# Patient Record
Sex: MALE | Race: WHITE | NOT HISPANIC OR LATINO | Employment: FULL TIME | URBAN - METROPOLITAN AREA
[De-identification: names, ages, dates, MRNs, and addresses within clinical notes are randomized per-mention and may not be internally consistent; named-entity substitution may affect disease eponyms.]

---

## 2023-10-10 PROBLEM — G47.21 DELAYED SLEEP PHASE SYNDROME: Status: ACTIVE | Noted: 2023-10-10

## 2023-10-10 PROBLEM — G89.29 CHRONIC BACK PAIN: Status: ACTIVE | Noted: 2023-10-10

## 2023-10-10 PROBLEM — J32.9 CHRONIC SINUSITIS: Status: ACTIVE | Noted: 2023-10-10

## 2023-10-10 PROBLEM — G47.19 EXCESSIVE DAYTIME SLEEPINESS: Status: ACTIVE | Noted: 2023-10-10

## 2023-10-10 PROBLEM — R13.14 DYSPHAGIA, PHARYNGOESOPHAGEAL PHASE: Status: ACTIVE | Noted: 2023-10-10

## 2023-10-10 PROBLEM — M54.9 CHRONIC BACK PAIN: Status: ACTIVE | Noted: 2023-10-10

## 2023-10-10 PROBLEM — G47.9 SLEEP DISTURBANCES: Status: ACTIVE | Noted: 2023-10-10

## 2023-10-10 PROBLEM — K20.0 EOSINOPHILIC ESOPHAGITIS: Status: ACTIVE | Noted: 2023-10-10

## 2023-10-10 PROBLEM — F32.A ANXIETY AND DEPRESSION: Status: ACTIVE | Noted: 2023-10-10

## 2023-10-10 PROBLEM — J34.2 DEVIATED SEPTUM: Status: ACTIVE | Noted: 2023-10-10

## 2023-10-10 PROBLEM — F41.9 ANXIETY AND DEPRESSION: Status: ACTIVE | Noted: 2023-10-10

## 2023-10-10 PROBLEM — F51.04 CHRONIC INSOMNIA: Status: ACTIVE | Noted: 2023-10-10

## 2023-10-10 PROBLEM — M51.369 DEGENERATION OF INTERVERTEBRAL DISC OF LUMBAR REGION: Status: ACTIVE | Noted: 2023-10-10

## 2023-10-10 PROBLEM — M51.36 DEGENERATION OF INTERVERTEBRAL DISC OF LUMBAR REGION: Status: ACTIVE | Noted: 2023-10-10

## 2023-10-10 PROBLEM — E04.9 ENLARGED THYROID: Status: ACTIVE | Noted: 2023-10-10

## 2023-10-10 PROBLEM — G47.30 MILD SLEEP APNEA: Status: ACTIVE | Noted: 2023-10-10

## 2023-10-10 PROBLEM — J34.3 HYPERTROPHY OF INFERIOR NASAL TURBINATE: Status: ACTIVE | Noted: 2023-10-10

## 2023-10-10 PROBLEM — M51.36 L4-L5 DISC BULGE: Status: ACTIVE | Noted: 2023-10-10

## 2023-10-10 PROBLEM — J30.2 SEASONAL ALLERGIES: Status: ACTIVE | Noted: 2023-10-10

## 2023-10-10 PROBLEM — L73.9 NASAL FOLLICULITIS: Status: ACTIVE | Noted: 2023-10-10

## 2023-10-10 PROBLEM — L24.9 IRRITANT DERMATITIS: Status: ACTIVE | Noted: 2023-10-10

## 2023-10-10 PROBLEM — E55.9 VITAMIN D DEFICIENCY: Status: ACTIVE | Noted: 2023-10-10

## 2023-10-10 PROBLEM — M51.369 L4-L5 DISC BULGE: Status: ACTIVE | Noted: 2023-10-10

## 2023-10-10 PROBLEM — M41.9 SCOLIOSIS: Status: ACTIVE | Noted: 2023-10-10

## 2023-10-10 PROBLEM — R03.0 ELEVATED BLOOD PRESSURE READING: Status: ACTIVE | Noted: 2023-10-10

## 2023-10-10 PROBLEM — M99.05 SEGMENTAL AND SOMATIC DYSFUNCTION OF PELVIC REGION: Status: ACTIVE | Noted: 2023-10-10

## 2023-10-10 PROBLEM — R06.83 SNORING: Status: ACTIVE | Noted: 2023-10-10

## 2023-10-10 RX ORDER — PSEUDOEPHEDRINE HCL 30 MG
TABLET ORAL
COMMUNITY

## 2023-10-10 RX ORDER — MULTIVITAMIN
TABLET ORAL
COMMUNITY

## 2023-10-10 RX ORDER — OMEPRAZOLE 40 MG/1
1 CAPSULE, DELAYED RELEASE ORAL
COMMUNITY
Start: 2022-05-04

## 2023-10-10 RX ORDER — ACETAMINOPHEN 500 MG
TABLET ORAL
COMMUNITY

## 2023-10-10 RX ORDER — CALCIUM CARBONATE/VITAMIN D3 600 MG-10
TABLET ORAL
COMMUNITY

## 2023-10-11 ENCOUNTER — CLINICAL SUPPORT (OUTPATIENT)
Dept: ALLERGY | Facility: CLINIC | Age: 38
End: 2023-10-11
Payer: COMMERCIAL

## 2023-10-11 DIAGNOSIS — Z76.89 ENCOUNTER FOR ALLERGY INJECTION: ICD-10-CM

## 2023-10-11 PROCEDURE — 95117 IMMUNOTHERAPY INJECTIONS: CPT | Performed by: ALLERGY & IMMUNOLOGY

## 2023-10-11 NOTE — PROGRESS NOTES
Here for routine allergy shots.       Area cleansed with alcohol.  Subcutaneous injection performed in clean fashion. Patient tolerated well without adverse reaction.

## 2023-11-07 PROCEDURE — 95165 ANTIGEN THERAPY SERVICES: CPT | Performed by: ALLERGY & IMMUNOLOGY

## 2023-11-08 ENCOUNTER — CLINICAL SUPPORT (OUTPATIENT)
Dept: ALLERGY | Facility: CLINIC | Age: 38
End: 2023-11-08
Payer: COMMERCIAL

## 2023-11-08 DIAGNOSIS — J30.1 SEASONAL ALLERGIC RHINITIS DUE TO POLLEN: ICD-10-CM

## 2023-11-08 DIAGNOSIS — J30.89 ALLERGIC RHINITIS DUE TO DUST MITE: ICD-10-CM

## 2023-11-08 PROCEDURE — 95165 ANTIGEN THERAPY SERVICES: CPT | Performed by: ALLERGY & IMMUNOLOGY

## 2023-11-08 PROCEDURE — 95117 IMMUNOTHERAPY INJECTIONS: CPT | Performed by: ALLERGY & IMMUNOLOGY

## 2023-11-10 PROBLEM — J30.89 ALLERGIC RHINITIS DUE TO DUST MITE: Status: ACTIVE | Noted: 2023-11-10

## 2023-11-10 PROBLEM — J30.1 SEASONAL ALLERGIC RHINITIS DUE TO POLLEN: Status: ACTIVE | Noted: 2023-11-10

## 2023-11-10 NOTE — PROGRESS NOTES
Here for routine allergy shots.         Area cleansed with alcohol.  Subcutaneous injection performed in clean fashion. Patient tolerated well without adverse reaction.       Remix 2  vials = 12 units

## 2023-12-06 ENCOUNTER — CLINICAL SUPPORT (OUTPATIENT)
Dept: ALLERGY | Facility: CLINIC | Age: 38
End: 2023-12-06
Payer: COMMERCIAL

## 2023-12-06 DIAGNOSIS — J30.89 ALLERGIC RHINITIS DUE TO DUST MITE: ICD-10-CM

## 2023-12-06 DIAGNOSIS — J30.1 SEASONAL ALLERGIC RHINITIS DUE TO POLLEN: ICD-10-CM

## 2023-12-06 PROCEDURE — 95117 IMMUNOTHERAPY INJECTIONS: CPT | Performed by: ALLERGY & IMMUNOLOGY

## 2024-01-03 ENCOUNTER — CLINICAL SUPPORT (OUTPATIENT)
Dept: ALLERGY | Facility: CLINIC | Age: 39
End: 2024-01-03
Payer: COMMERCIAL

## 2024-01-03 DIAGNOSIS — J30.1 SEASONAL ALLERGIC RHINITIS DUE TO POLLEN: ICD-10-CM

## 2024-01-03 DIAGNOSIS — J30.89 ALLERGIC RHINITIS DUE TO DUST MITE: ICD-10-CM

## 2024-01-03 PROCEDURE — 95117 IMMUNOTHERAPY INJECTIONS: CPT | Performed by: ALLERGY & IMMUNOLOGY

## 2024-01-31 ENCOUNTER — CLINICAL SUPPORT (OUTPATIENT)
Dept: ALLERGY | Facility: CLINIC | Age: 39
End: 2024-01-31
Payer: COMMERCIAL

## 2024-01-31 DIAGNOSIS — J30.1 SEASONAL ALLERGIC RHINITIS DUE TO POLLEN: ICD-10-CM

## 2024-01-31 DIAGNOSIS — J30.89 ALLERGIC RHINITIS DUE TO DUST MITE: ICD-10-CM

## 2024-01-31 PROCEDURE — 95117 IMMUNOTHERAPY INJECTIONS: CPT | Performed by: ALLERGY & IMMUNOLOGY

## 2024-02-14 ENCOUNTER — HOSPITAL ENCOUNTER (EMERGENCY)
Facility: HOSPITAL | Age: 39
Discharge: HOME | End: 2024-02-14
Attending: STUDENT IN AN ORGANIZED HEALTH CARE EDUCATION/TRAINING PROGRAM
Payer: COMMERCIAL

## 2024-02-14 VITALS
RESPIRATION RATE: 18 BRPM | HEIGHT: 70 IN | TEMPERATURE: 98.4 F | SYSTOLIC BLOOD PRESSURE: 129 MMHG | HEART RATE: 74 BPM | WEIGHT: 185 LBS | BODY MASS INDEX: 26.48 KG/M2 | DIASTOLIC BLOOD PRESSURE: 66 MMHG | OXYGEN SATURATION: 99 %

## 2024-02-14 DIAGNOSIS — S61.219A FINGER LACERATION, INITIAL ENCOUNTER: Primary | ICD-10-CM

## 2024-02-14 PROCEDURE — 99281 EMR DPT VST MAYX REQ PHY/QHP: CPT | Performed by: STUDENT IN AN ORGANIZED HEALTH CARE EDUCATION/TRAINING PROGRAM

## 2024-02-14 PROCEDURE — 99283 EMERGENCY DEPT VISIT LOW MDM: CPT | Performed by: STUDENT IN AN ORGANIZED HEALTH CARE EDUCATION/TRAINING PROGRAM

## 2024-02-14 ASSESSMENT — LIFESTYLE VARIABLES
HAVE YOU EVER FELT YOU SHOULD CUT DOWN ON YOUR DRINKING: NO
EVER HAD A DRINK FIRST THING IN THE MORNING TO STEADY YOUR NERVES TO GET RID OF A HANGOVER: NO
EVER FELT BAD OR GUILTY ABOUT YOUR DRINKING: NO
HAVE PEOPLE ANNOYED YOU BY CRITICIZING YOUR DRINKING: NO

## 2024-02-14 ASSESSMENT — PAIN - FUNCTIONAL ASSESSMENT: PAIN_FUNCTIONAL_ASSESSMENT: 0-10

## 2024-02-14 ASSESSMENT — COLUMBIA-SUICIDE SEVERITY RATING SCALE - C-SSRS
6. HAVE YOU EVER DONE ANYTHING, STARTED TO DO ANYTHING, OR PREPARED TO DO ANYTHING TO END YOUR LIFE?: NO
2. HAVE YOU ACTUALLY HAD ANY THOUGHTS OF KILLING YOURSELF?: NO
1. IN THE PAST MONTH, HAVE YOU WISHED YOU WERE DEAD OR WISHED YOU COULD GO TO SLEEP AND NOT WAKE UP?: NO

## 2024-02-14 ASSESSMENT — PAIN SCALES - GENERAL: PAINLEVEL_OUTOF10: 0 - NO PAIN

## 2024-02-14 NOTE — DISCHARGE INSTRUCTIONS
If you notice redness streaking up the hand, fevers, inability to flex the finger secondary to swelling or pain then please seek reevaluation.  Keep the Surgicel in place for 24 hours and then remove with moisture.  If you notice signs of infection follow-up here, your primary care doctor or urgent care.

## 2024-02-14 NOTE — ED PROVIDER NOTES
EMERGENCY DEPARTMENT ENCOUNTER      Pt Name: Karthik Plunkett  MRN: [id number]  Birthdate 1985  Date of evaluation: 2/14/2024  Provider: Phi Tse DO    CHIEF COMPLAINT       Chief Complaint   Patient presents with    Laceration         HISTORY OF PRESENT ILLNESS    Mukul is a 38-year-old male presenting emergency department after he cut his finger on a razor that he was using for shaving.  Is a single blade razor that he was opening up and then he sliced the right third digit when he was changing the blade out.  He is kept the site clean with soap and water as well as Neosporin.  He originally presented to an urgent care and they did not examine him and he presented here to the emergency department.  Not had any fevers or chills.  No redness around the site.  No discharge.  This happened yesterday and started bleeding and when he pulled the gauze off.  He does have a tetanus vaccine within the last 7 years      History provided by:  Patient      Nursing Notes were reviewed.    PAST MEDICAL HISTORY     Past Medical History:   Diagnosis Date    Eosinophilic esophagitis 07/20/2022    Eosinophilic esophagitis    Personal history of other diseases of the nervous system and sense organs     History of sleep apnea    Personal history of other diseases of the respiratory system 09/09/2019    History of allergic rhinitis         SURGICAL HISTORY       Past Surgical History:   Procedure Laterality Date    OTHER SURGICAL HISTORY  09/09/2019    Nasal septoplasty         CURRENT MEDICATIONS       Previous Medications    CHOLECALCIFEROL (VITAMIN D3) 50 MCG (2,000 UNIT) CAPSULE    Take by mouth.    FEXOFENADINE HCL (ALLEGRA ORAL)    Take by mouth.    MULTIVITAMIN TABLET    Take by mouth.    OMEGA-3 FATTY ACIDS-FISH OIL (ONE-PER-DAY OMEGA-3) 684-1,200 MG CAPSULE    Take by mouth.    OMEPRAZOLE (PRILOSEC) 40 MG DR CAPSULE    Take 1 capsule (40 mg) by mouth once daily in the morning. Take before meals.  30 mintues before  breakfast.    PSEUDOEPHEDRINE (SUDAFED) 30 MG TABLET    Take by mouth.       ALLERGIES     Patient has no known allergies.    FAMILY HISTORY       Family History   Problem Relation Name Age of Onset    Colonic polyp Father      Other (CVA) Maternal Grandfather            SOCIAL HISTORY       Social History     Socioeconomic History    Marital status: Single     Spouse name: None    Number of children: None    Years of education: None    Highest education level: None   Occupational History    None   Tobacco Use    Smoking status: None    Smokeless tobacco: None   Substance and Sexual Activity    Alcohol use: None    Drug use: None    Sexual activity: None   Other Topics Concern    None   Social History Narrative    None     Social Determinants of Health     Financial Resource Strain: Not on file   Food Insecurity: Not on file   Transportation Needs: Not on file   Physical Activity: Not on file   Stress: Not on file   Social Connections: Not on file   Intimate Partner Violence: Not on file   Housing Stability: Not on file       SCREENINGS                        PHYSICAL EXAM    (up to 7 for level 4, 8 or more for level 5)     ED Triage Vitals [02/14/24 1418]   Temperature Heart Rate Respirations BP   36.9 °C (98.4 °F) 74 18 129/66      Pulse Ox Temp Source Heart Rate Source Patient Position   99 % Temporal Monitor Sitting      BP Location FiO2 (%)     Right arm --       Physical Exam  Constitutional:       General: He is not in acute distress.     Appearance: He is not ill-appearing, toxic-appearing or diaphoretic.   Cardiovascular:      Pulses: Normal pulses.   Pulmonary:      Effort: Pulmonary effort is normal.   Skin:     General: Skin is warm and dry.      Capillary Refill: Capillary refill takes less than 2 seconds.      Findings: Laceration present.      Comments: Laceration to right third digit less than 1 cm in length.  No surrounding erythema.  No active bleeding.  No exudate or discharge.   Neurological:       Mental Status: He is alert.      Comments: Sensation and range of motion intact in hand          DIAGNOSTIC RESULTS     LABS:  Labs Reviewed - No data to display    All other labs were within normal range or not returned as of this dictation.    Imaging  No orders to display        Procedures  Procedures     EMERGENCY DEPARTMENT COURSE/MDM:     ED Course as of 02/14/24 1439 Wed Feb 14, 2024   1435 Patient in no cute distress with 1cm laceration not actively bleeding onSite cleaned, applied surgicel with gauze and tape. Return precautions discussed and patient clear to dischagre [MT]      ED Course User Index  [MT] Phi Tse,          Diagnoses as of 02/14/24 1439   Finger laceration, initial encounter        Medical Decision Making  Karthik zazueta is a 38-year-old male in no acute distress presenting for a right finger laceration.    Laceration happened yesterday with a razor while he was shaving.  He presented to the emergency department after he remove the gauze from yesterday and started bleeding again.  At the sites clean with soap and water as well as Neosporin.  He has a tetanus vaccination within the last 7 years.  He is not on any blood thinners and does not have any other significant medical past history.  No other signs of injury, not in any acute distress, pulses sensation and strength with finger and hand are all intact.    The laceration is very superficial and there is no signs of infection.  No signs of any tendon involvement not actively bleeding. cleaned site with alcohol, applied Surgicel to the site and wrapped in gauze and tape shot.  Gave patient instructions on how to keep the site clean and when to remove the bandage.  Discussed return precautions for signs of infection.  Patient should follow-up with his primary care physician several days if any questions or no signs of improvement        Patient and or family in agreement and understanding of treatment plan.  All questions answered.       I reviewed the case with the attending ED physician. The attending ED physician agrees with the plan. Patient and/or patient´s representative was counseled regarding labs, imaging, likely diagnosis, and plan. All questions were answered.    ED Medications administered this visit:  Medications - No data to display    New Prescriptions from this visit:    New Prescriptions    No medications on file       Follow-up:  Nayan Poon MD  [id number] [id number] [phone]    Call   If symptoms worsen        Final Impression:   1. Finger laceration, initial encounter          (Please note that portions of this note were completed with a voice recognition program.  Efforts were made to edit the dictations but occasionally words are mis-transcribed.)     Phi Tse, DO  Resident  02/14/24 [id number]

## 2024-02-28 ENCOUNTER — CLINICAL SUPPORT (OUTPATIENT)
Dept: ALLERGY | Facility: CLINIC | Age: 39
End: 2024-02-28
Payer: COMMERCIAL

## 2024-02-28 DIAGNOSIS — J30.1 SEASONAL ALLERGIC RHINITIS DUE TO POLLEN: ICD-10-CM

## 2024-02-28 DIAGNOSIS — J30.89 ALLERGIC RHINITIS DUE TO DUST MITE: ICD-10-CM

## 2024-02-28 PROCEDURE — 95117 IMMUNOTHERAPY INJECTIONS: CPT | Performed by: ALLERGY & IMMUNOLOGY

## 2024-03-27 ENCOUNTER — CLINICAL SUPPORT (OUTPATIENT)
Dept: ALLERGY | Facility: CLINIC | Age: 39
End: 2024-03-27
Payer: COMMERCIAL

## 2024-03-27 DIAGNOSIS — J30.1 SEASONAL ALLERGIC RHINITIS DUE TO POLLEN: ICD-10-CM

## 2024-03-27 DIAGNOSIS — J30.89 ALLERGIC RHINITIS DUE TO DUST MITE: ICD-10-CM

## 2024-03-27 PROCEDURE — 95117 IMMUNOTHERAPY INJECTIONS: CPT | Performed by: ALLERGY & IMMUNOLOGY

## 2024-04-24 ENCOUNTER — CLINICAL SUPPORT (OUTPATIENT)
Dept: ALLERGY | Facility: CLINIC | Age: 39
End: 2024-04-24
Payer: COMMERCIAL

## 2024-04-24 DIAGNOSIS — J30.1 SEASONAL ALLERGIC RHINITIS DUE TO POLLEN: ICD-10-CM

## 2024-04-24 DIAGNOSIS — J30.89 ALLERGIC RHINITIS DUE TO DUST MITE: ICD-10-CM

## 2024-04-24 PROCEDURE — 95117 IMMUNOTHERAPY INJECTIONS: CPT | Performed by: ALLERGY & IMMUNOLOGY

## 2024-05-08 ENCOUNTER — OFFICE VISIT (OUTPATIENT)
Dept: ALLERGY | Facility: CLINIC | Age: 39
End: 2024-05-08
Payer: COMMERCIAL

## 2024-05-08 VITALS
RESPIRATION RATE: 17 BRPM | HEIGHT: 70 IN | DIASTOLIC BLOOD PRESSURE: 80 MMHG | WEIGHT: 202 LBS | OXYGEN SATURATION: 97 % | HEART RATE: 81 BPM | SYSTOLIC BLOOD PRESSURE: 120 MMHG | TEMPERATURE: 98.8 F | BODY MASS INDEX: 28.92 KG/M2

## 2024-05-08 DIAGNOSIS — J30.1 ALLERGIC RHINITIS DUE TO POLLEN, UNSPECIFIED SEASONALITY: ICD-10-CM

## 2024-05-08 DIAGNOSIS — R09.81 NASAL CONGESTION: ICD-10-CM

## 2024-05-08 DIAGNOSIS — L23.0 CONTACT DERMATITIS DUE TO METALS, UNSPECIFIED CONTACT DERMATITIS TYPE: ICD-10-CM

## 2024-05-08 DIAGNOSIS — J30.89 SEASONAL ALLERGIC RHINITIS DUE TO OTHER ALLERGIC TRIGGER: Primary | ICD-10-CM

## 2024-05-08 DIAGNOSIS — K20.0 EOSINOPHILIC ESOPHAGITIS: ICD-10-CM

## 2024-05-08 PROCEDURE — 99214 OFFICE O/P EST MOD 30 MIN: CPT | Performed by: ALLERGY & IMMUNOLOGY

## 2024-05-08 RX ORDER — CARBINOXAMINE MALEATE 4 MG/1
4 TABLET ORAL
Qty: 28 TABLET | Refills: 11 | Status: SHIPPED | OUTPATIENT
Start: 2024-05-08 | End: 2025-05-08

## 2024-05-08 RX ORDER — TRIAMCINOLONE ACETONIDE 0.25 MG/G
1 CREAM TOPICAL 2 TIMES DAILY
COMMUNITY
Start: 2023-08-01

## 2024-05-08 RX ORDER — AZELASTINE 1 MG/ML
2 SPRAY, METERED NASAL 2 TIMES DAILY
Qty: 30 ML | Refills: 5 | Status: SHIPPED | OUTPATIENT
Start: 2024-05-08 | End: 2025-05-08

## 2024-05-08 NOTE — PROGRESS NOTES
"Patient ID: Karthik Plunkett \"Marshall" is a 38 y.o. male.     Chief Complaint: Follow up allergic rhinitis  History Of Present Illness  Karthik Plunkett \"Marshall" is a 38 y.o. male with PMx allergic rhinitis, atopic dermatitis, contact dermatitis, eoe presenting for follow up.     Had a contact sensitivity. Established with new primary who referred to derm at [facility]. Had positive patch tests.    Food Allergy  No  EOE hx.  Prilosec BID. Thinking about weaning off Omeprazole. HE is taking Omeprazole once a week. He thinks bread and Peanut butter are triggers.  He does have some dairy in his diet.  We have previously discussed Dupixent and he has also recently discussed with Dr. Barajas.    Eczema/ Atopic Dermatitis  Contact sensitivity testing reviewed.    Asthma  No    Rhinoconjunctivitis  DM, Mold, Tree and Grass mix  Doing well on shots  He has had more congestion just this year. HE has previously not been able to tolerate nasal steroid. He has had septoplasty in the past.  Sometimes uses nasal saline, sometimes helps, sometimes he feels like it might make it worse. He feels like there is something stuck inside his sinus.  Congestion will fluctuate throughout the day.  He feels his smell is less since the septoplasty, but the taste is intact.  He is tolerating the injections without side effects.  On allegra.  Claritin does not work for him  Zyrtec, Xyzal, benadryl and hydroxyzine make him really tired  Nasal steroids cause eye pain.  Sinus saline rinses feel like they get stuck in the sinus.      Drug Allergy   No    Insect Allergy   No    Infections  No history of frequent or recurrent infections      Review of Systems    Pertinent positives and negatives have been assessed in the HPI. All other systems have been reviewed and are negative except as noted in the HPI.    Allergies  Benzalkonium chloride, Carvone, Cocamidopropyl betaine, Dermatitis antigens, Limonene, Linalool, and Oleamidopropyl dimethylamine    Past Medical " "History  He has a past medical history of Eosinophilic esophagitis (07/20/2022), Personal history of other diseases of the nervous system and sense organs, and Personal history of other diseases of the respiratory system (09/09/2019).    Family History  Family History   Problem Relation Name Age of Onset    Colonic polyp Father      Other (CVA) Maternal Grandfather             Surgical History  He has a past surgical history that includes Other surgical history (09/09/2019).    Social/Environmental History  He has no history on file for tobacco use, alcohol use, and drug use.    MEDICATIONS  Current Outpatient Medications on File Prior to Visit   Medication Sig Dispense Refill    cholecalciferol (Vitamin D3) 50 mcg (2,000 unit) capsule Take by mouth.      fexofenadine HCl (ALLEGRA ORAL) Take by mouth.      multivitamin tablet Take by mouth.      omega-3 fatty acids-fish oil (One-Per-Day Omega-3) 684-1,200 mg capsule Take by mouth.      omeprazole (PriLOSEC) 40 mg DR capsule Take 1 capsule (40 mg) by mouth once daily in the morning. Take before meals.  30 mintues before breakfast.      pseudoephedrine (Sudafed) 30 mg tablet Take by mouth.      triamcinolone (Kenalog) 0.025 % cream Apply 1 Application topically 2 times a day.       No current facility-administered medications on file prior to visit.       Physical Exam  Visit Vitals  /80   Pulse 81   Temp 37.1 °C (98.8 °F) (Temporal)   Resp 17   Ht 1.778 m (5' 10\")   Wt 91.6 kg (202 lb)   SpO2 97%   BMI 28.98 kg/m²   BSA 2.13 m²       Wt Readings from Last 1 Encounters:   05/08/24 91.6 kg (202 lb)       Physical Exam    General: Well appearing, no acute distress  Head: Normocephalic, atraumatic, neck supple without lymphadenopathy  Eyes: EOMI, non-injected  Ears: TM's pale with normal landmarks.  Nose: No nasal crease, nares patent, slightly boggy turbinates, minimal discharge  Throat: Normal dentition, no erythema  Heart: Regular rate and rhythm  Lungs: Clear " to auscultation bilaterally, effort normal  Abdomen: Soft, non-tender, normal bowel sounds  Extremities: Moves all extremities symmetrically, no edema  Skin: No rashes/lesions  Psych: normal mood and affect    LAB RESULTS:  CBC:  Recent Labs     08/23/19  1402   WBC 7.8   HGB 16.3   HCT 50.1      MCV 91       CMP:  Recent Labs     09/24/21  1058 08/23/19  1402    139   K 3.7 3.8    104   CO2 24 28   ANIONGAP 13 11   BUN 15 17   CREATININE 1.14 1.09   MG  --  2.10     Recent Labs     08/23/19  1402   ALBUMIN 4.6   ALKPHOS 68   ALT 15   AST 15   BILITOT 1.0       ALLERGY:   Lab Results   Component Value Date    ICIGE 129.0 (H) 09/04/2019    WHITEASH 0.44 (A) 09/04/2019    SILVERBIRCH <0.35 09/04/2019    BOXELDER <0.35 09/04/2019    MOUNTJUNIPER <0.35 09/04/2019    COTTONWOOD <0.35 09/04/2019    ELM <0.35 09/04/2019    MULBERRY <0.35 09/04/2019    PECANHICKORY <0.35 09/04/2019    MAPLESYCAMOR <0.35 09/04/2019    OAK <0.35 09/04/2019    BERMUDAGR 0.55 (A) 09/04/2019    JOHNSONGR <0.35 09/04/2019    BLUEGRASS 3.19 (A) 09/04/2019    TIMOTHYGRASS 1.88 (A) 09/04/2019     Lab Results   Component Value Date    LAMBQUART <0.35 09/04/2019    PIGWEED <0.35 09/04/2019    COMRAGWEED <0.35 09/04/2019    SHEEPSOR <0.35 09/04/2019    PLANTAIN <0.35 09/04/2019    CATEPI <0.35 09/04/2019    DOGEPI <0.35 09/04/2019    ALTERNA 6.39 (A) 09/04/2019    CLADHERB <0.35 09/04/2019    ICA04 <0.35 09/04/2019    DERMFAR <0.35 09/04/2019    DERMPTE <0.35 09/04/2019    COCKR <0.35 09/04/2019     Lab Results   Component Value Date    WALNUT <0.35 09/04/2019     Recent Labs     09/04/19  1200   ICIGE 129.0*     IMMUNO:   Recent Labs     11/03/21  1633   IGG 1,250      IGM 93       HEME/ENDO:  Recent Labs     08/23/19  1402   TSH 1.20         Assessment/Plan   37 yo with a history of allergic rhinitis who is doing well on immunotherapy for dust mite, mold tree and grass.   History of septoplasty. He reports intermittent nasal  congestion. He has not been able to use nasal steroids due to eye side effects (pain) He is willing to try astelin.  He may consider seeing ENT again.  I also recommended a trial of Carbinoxamine since he does not feel like availble OTC antihistamine help.  Contact allergens recently dx at [facility] derm. Skin improved avoiding triggers.  Weaned mostly off PPI and plans follow up with GI doctor.  EOE-controlled at this time      Mercy Stern, DO

## 2024-05-08 NOTE — PATIENT INSTRUCTIONS
To increase the efficacy of your intranasal steroid spray, be sure to look down while using it and point the bottle in the direction of the ear, not the middle part of your nose.  Give a slight sniff in after spraying and then repeat on the other side. If the spray goes to the middle part of the nose (the septum), there are increased risks of side effects such as bleeding from the nose.

## 2024-05-22 ENCOUNTER — CLINICAL SUPPORT (OUTPATIENT)
Dept: ALLERGY | Facility: CLINIC | Age: 39
End: 2024-05-22
Payer: COMMERCIAL

## 2024-05-22 DIAGNOSIS — J30.1 ALLERGIC RHINITIS DUE TO POLLEN, UNSPECIFIED SEASONALITY: ICD-10-CM

## 2024-05-22 DIAGNOSIS — J30.89 ALLERGIC RHINITIS DUE TO DUST MITE: ICD-10-CM

## 2024-05-22 PROCEDURE — 95117 IMMUNOTHERAPY INJECTIONS: CPT | Performed by: ALLERGY & IMMUNOLOGY

## 2024-06-19 ENCOUNTER — APPOINTMENT (OUTPATIENT)
Dept: ALLERGY | Facility: CLINIC | Age: 39
End: 2024-06-19
Payer: COMMERCIAL

## 2024-06-19 DIAGNOSIS — J30.89 ALLERGIC RHINITIS DUE TO DUST MITE: ICD-10-CM

## 2024-06-19 DIAGNOSIS — J30.1 SEASONAL ALLERGIC RHINITIS DUE TO POLLEN: ICD-10-CM

## 2024-06-19 PROCEDURE — 95117 IMMUNOTHERAPY INJECTIONS: CPT | Performed by: ALLERGY & IMMUNOLOGY

## 2024-07-17 ENCOUNTER — OFFICE VISIT (OUTPATIENT)
Dept: ALLERGY | Facility: CLINIC | Age: 39
End: 2024-07-17
Payer: COMMERCIAL

## 2024-07-17 ENCOUNTER — APPOINTMENT (OUTPATIENT)
Dept: ALLERGY | Facility: CLINIC | Age: 39
End: 2024-07-17
Payer: COMMERCIAL

## 2024-07-17 VITALS
WEIGHT: 202 LBS | RESPIRATION RATE: 17 BRPM | DIASTOLIC BLOOD PRESSURE: 78 MMHG | TEMPERATURE: 98.7 F | OXYGEN SATURATION: 95 % | BODY MASS INDEX: 28.92 KG/M2 | HEIGHT: 70 IN | HEART RATE: 73 BPM | SYSTOLIC BLOOD PRESSURE: 122 MMHG

## 2024-07-17 DIAGNOSIS — L20.9 ATOPIC DERMATITIS, UNSPECIFIED TYPE: ICD-10-CM

## 2024-07-17 DIAGNOSIS — L20.89 OTHER ATOPIC DERMATITIS: Primary | ICD-10-CM

## 2024-07-17 DIAGNOSIS — H01.139 ECZEMA OF EYELID, UNSPECIFIED LATERALITY: ICD-10-CM

## 2024-07-17 PROCEDURE — 99214 OFFICE O/P EST MOD 30 MIN: CPT | Performed by: ALLERGY & IMMUNOLOGY

## 2024-07-17 PROCEDURE — 3008F BODY MASS INDEX DOCD: CPT | Performed by: ALLERGY & IMMUNOLOGY

## 2024-07-17 PROCEDURE — 95117 IMMUNOTHERAPY INJECTIONS: CPT | Performed by: ALLERGY & IMMUNOLOGY

## 2024-07-17 RX ORDER — TACROLIMUS 1 MG/G
OINTMENT TOPICAL 2 TIMES DAILY
Qty: 100 G | Refills: 1 | Status: SHIPPED | OUTPATIENT
Start: 2024-07-17 | End: 2024-08-16

## 2024-07-17 RX ORDER — PIMECROLIMUS 10 MG/G
CREAM TOPICAL 2 TIMES DAILY
Qty: 30 G | Refills: 1 | Status: SHIPPED | OUTPATIENT
Start: 2024-07-17 | End: 2024-08-16

## 2024-07-17 RX ORDER — TRIAMCINOLONE ACETONIDE 0.25 MG/G
OINTMENT TOPICAL 2 TIMES DAILY
Qty: 80 G | Refills: 2 | Status: SHIPPED | OUTPATIENT
Start: 2024-07-17

## 2024-07-17 NOTE — PROGRESS NOTES
"Patient ID: Karthik Plunkett \"Marshall" is a 39 y.o. male.     History Of Present Illness  Karthik Plunkett \"Marshall" is a 39 y.o. male with PMx Atopic Dermatitis/Contact Dermatitis, EoE, Allergic Rhinitis/Allergic Conjunctivitis presenting for rash.      Atopic Dermatitis/Contact Dermatitis:  -Patient had patch testing performed by Dermatology ([facility]) with positive patch results  -Patient has been following his safe list; states there were no shampoos listed on his safe list so he has been trying to find bland shampoos to use (has used Vanicream shampoo in the past)  -He has noticed some improvement in rash, but he breaks out in a rash every 1-2 weeks  -Rash develops above eyebrow and eyelid and cheeks; rash is not present today  -Initially when rash started months ago he had associated erythema, but now it appears that he only has dryness and skin flaking occurring  -States he will apply steroid cream for a day a two and then stops, and then rash occurs a week or two later  -Moisturizes intermittently with Vanicream  -Current regimen: triamcinolone cream     EoE:  -Well-controlled  -Denies dysphagia; occasionally has heart burn  -weaned off omeprazole since LOV and doing well  -Continues to avoid bread and peanuts  -Loosely avoids dairy  -Discussed starting Dupixent in the past  -Currently not on any medications     Allergic Rhinitis/Allergic Conjunctivitis:  -ST+ DM, Mold, Tree and Grass mix  -Has noticed some improvement on SCIT, but feels he has plateaued  -Tolerating SCIT well  -Current regimen: SCIT [id number], azelastine 2 spray/nostril BID as needed, Allegra 180 mg daily as needed      Review of Systems    Pertinent positives and negatives have been assessed in the HPI. All other systems have been reviewed and are negative except as noted in the HPI.    Allergies  Benzalkonium chloride, Carvone, Cocamidopropyl betaine, Dermatitis antigens, Limonene, Linalool, and Oleamidopropyl dimethylamine    Past Medical History  He has " a past medical history of Eosinophilic esophagitis (07/20/2022), Personal history of other diseases of the nervous system and sense organs, and Personal history of other diseases of the respiratory system (09/09/2019).    Family History  Family History   Problem Relation Name Age of Onset    Colonic polyp Father      Other (CVA) Maternal Grandfather       Surgical History  He has a past surgical history that includes Other surgical history (09/09/2019).    Social/Environmental History  He has no history on file for tobacco use, alcohol use, and drug use.      MEDICATIONS  Current Outpatient Medications on File Prior to Visit   Medication Sig Dispense Refill    azelastine (Astelin) 137 mcg (0.1 %) nasal spray Administer 2 sprays into each nostril 2 times a day. Use in each nostril as directed 30 mL 5    carbinoxamine maleate 4 mg tablet Take 4 mg by mouth 2 times a day. 28 tablet 11    cholecalciferol (Vitamin D3) 50 mcg (2,000 unit) capsule Take by mouth.      fexofenadine HCl (ALLEGRA ORAL) Take by mouth.      multivitamin tablet Take by mouth.      omega-3 fatty acids-fish oil (One-Per-Day Omega-3) 684-1,200 mg capsule Take by mouth.      omeprazole (PriLOSEC) 40 mg DR capsule Take 1 capsule (40 mg) by mouth once daily in the morning. Take before meals.  30 mintues before breakfast.      pseudoephedrine (Sudafed) 30 mg tablet Take by mouth.      triamcinolone (Kenalog) 0.025 % cream Apply 1 Application topically 2 times a day.       No current facility-administered medications on file prior to visit.         Physical Exam  There were no vitals taken for this visit.    Wt Readings from Last 1 Encounters:   05/08/24 91.6 kg (202 lb)       Physical Exam    General: Well appearing, no acute distress  Head: Normocephalic, atraumatic, neck supple without lymphadenopathy  Eyes: EOMI, non-injected  Nose: No nasal crease, nares patent, normal turbinates, no discharge  Throat: Normal dentition, no erythema  Heart: Regular  rate and rhythm  Lungs: Clear to auscultation bilaterally, effort normal  Abdomen: Soft, non-tender, normal bowel sounds  Extremities: Moves all extremities symmetrically, no edema  Skin: No rashes/lesions  Psych: normal mood and affect    LAB RESULTS:  CBC:  Recent Labs     08/23/19  1402   WBC 7.8   HGB 16.3   HCT 50.1      MCV 91       CMP:  Recent Labs     09/24/21  1058 08/23/19  1402    139   K 3.7 3.8    104   CO2 24 28   ANIONGAP 13 11   BUN 15 17   CREATININE 1.14 1.09   MG  --  2.10     Recent Labs     08/23/19  1402   ALBUMIN 4.6   ALKPHOS 68   ALT 15   AST 15   BILITOT 1.0       ALLERGY:   Lab Results   Component Value Date    ICIGE 129.0 (H) 09/04/2019    WHITEASH 0.44 (A) 09/04/2019    SILVERBIRCH <0.35 09/04/2019    BOXELDER <0.35 09/04/2019    MOUNTJUNIPER <0.35 09/04/2019    COTTONWOOD <0.35 09/04/2019    ELM <0.35 09/04/2019    MULBERRY <0.35 09/04/2019    PECANHICKORY <0.35 09/04/2019    MAPLESYCAMOR <0.35 09/04/2019    OAK <0.35 09/04/2019    BERMUDAGR 0.55 (A) 09/04/2019    JOHNSONGR <0.35 09/04/2019    BLUEGRASS 3.19 (A) 09/04/2019    TIMOTHYGRASS 1.88 (A) 09/04/2019     Lab Results   Component Value Date    LAMBQUART <0.35 09/04/2019    PIGWEED <0.35 09/04/2019    COMRAGWEED <0.35 09/04/2019    SHEEPSOR <0.35 09/04/2019    PLANTAIN <0.35 09/04/2019    CATEPI <0.35 09/04/2019    DOGEPI <0.35 09/04/2019    ALTERNA 6.39 (A) 09/04/2019    CLADHERB <0.35 09/04/2019    ICA04 <0.35 09/04/2019    DERMFAR <0.35 09/04/2019    DERMPTE <0.35 09/04/2019    COCKR <0.35 09/04/2019     Lab Results   Component Value Date    WALNUT <0.35 09/04/2019       Recent Labs     09/04/19  1200   ICIGE 129.0*       IMMUNO:   Recent Labs     11/03/21  1633   IGG 1,250      IGM 93       HEME/ENDO:  Recent Labs     08/23/19  1402   TSH 1.20     Procedure: Pt here for routine subcutaneous allergy injections. Pt received 0.5mL in L and R arms today. Pt tolerated procedure well without any adverse  "reactions. Sara Bobby LPN    Assessment/Plan   Assessment:  Karthik Plunkett \"Marshall" is a 39 y.o. male with PMx Atopic Dermatitis/Contact Dermatitis, EoE, Allergic Rhinitis/Allergic Conjunctivitis presenting for rash. Advised patient to use full 14-day course of topical steroid when rash does occur. Will also provided topical tacrolimus to apply 2x a week to affected areas regardless if rash is present.    Plan:  Atopic Dermatitis/Contact Dermatitis:  -Start topical tacrolimus 2x per week to common areas affected  -Continue with triamcinolone BID x2 weeks when rash does occur  -Recommend moisturizing daily  -Continue to follow safe list     EoE:  -Stable  -Continue to avoid wheat, peanut, dairy  -Consider starting Dupixent if symptoms recur     Allergic Rhinitis/Allergic Conjunctivitis:  -Stable  -Continue: SCIT [id number], azelastine 2 spray/nostril BID as needed, Allegra 180 mg daily as needed        Discussed with attending physician,    Antoine Sampson DO PGY-5    I saw and evaluated the patient. I personally obtained the key and critical portions of the history and physical exam or was physically present for key and critical portions performed by the resident/fellow. I reviewed the resident/fellow's documentation and discussed the patient with the resident/fellow. I agree with the resident/fellow's medical decision making as documented in the note.    Mercy Stern DO   "

## 2024-08-13 ENCOUNTER — APPOINTMENT (OUTPATIENT)
Dept: ALLERGY | Facility: CLINIC | Age: 39
End: 2024-08-13
Payer: COMMERCIAL

## 2024-08-21 ENCOUNTER — CLINICAL SUPPORT (OUTPATIENT)
Dept: ALLERGY | Facility: CLINIC | Age: 39
End: 2024-08-21
Payer: COMMERCIAL

## 2024-08-21 DIAGNOSIS — J30.1 SEASONAL ALLERGIC RHINITIS DUE TO POLLEN: ICD-10-CM

## 2024-08-21 DIAGNOSIS — J30.89 ALLERGIC RHINITIS DUE TO DUST MITE: ICD-10-CM

## 2024-08-21 PROCEDURE — 95165 ANTIGEN THERAPY SERVICES: CPT | Performed by: ALLERGY & IMMUNOLOGY

## 2024-08-21 PROCEDURE — 95117 IMMUNOTHERAPY INJECTIONS: CPT | Performed by: ALLERGY & IMMUNOLOGY

## 2024-08-23 NOTE — PROGRESS NOTES
Here for routine allergy shots.         Area cleansed with alcohol.  Subcutaneous injection performed in clean fashion. Patient tolerated well without adverse reaction.       REMIXED 2  VIALS = 12 UNITS

## 2024-09-11 ENCOUNTER — APPOINTMENT (OUTPATIENT)
Dept: ALLERGY | Facility: CLINIC | Age: 39
End: 2024-09-11
Payer: COMMERCIAL

## 2024-09-18 ENCOUNTER — APPOINTMENT (OUTPATIENT)
Dept: ALLERGY | Facility: CLINIC | Age: 39
End: 2024-09-18
Payer: COMMERCIAL

## 2024-09-18 DIAGNOSIS — J30.89 ALLERGIC RHINITIS DUE TO DUST MITE: ICD-10-CM

## 2024-09-18 DIAGNOSIS — J30.1 SEASONAL ALLERGIC RHINITIS DUE TO POLLEN: ICD-10-CM

## 2024-09-18 PROCEDURE — 95117 IMMUNOTHERAPY INJECTIONS: CPT | Performed by: ALLERGY & IMMUNOLOGY

## 2024-09-19 ENCOUNTER — OFFICE VISIT (OUTPATIENT)
Dept: PRIMARY CARE | Facility: CLINIC | Age: 39
End: 2024-09-19
Payer: COMMERCIAL

## 2024-09-19 VITALS
DIASTOLIC BLOOD PRESSURE: 80 MMHG | BODY MASS INDEX: 28.41 KG/M2 | WEIGHT: 198 LBS | SYSTOLIC BLOOD PRESSURE: 128 MMHG | OXYGEN SATURATION: 98 % | TEMPERATURE: 98 F | HEART RATE: 78 BPM | RESPIRATION RATE: 16 BRPM

## 2024-09-19 DIAGNOSIS — H60.332 ACUTE SWIMMER'S EAR OF LEFT SIDE: ICD-10-CM

## 2024-09-19 DIAGNOSIS — H66.92 ACUTE OTITIS MEDIA, LEFT: Primary | ICD-10-CM

## 2024-09-19 PROCEDURE — 99214 OFFICE O/P EST MOD 30 MIN: CPT | Performed by: FAMILY MEDICINE

## 2024-09-19 PROCEDURE — 1036F TOBACCO NON-USER: CPT | Performed by: FAMILY MEDICINE

## 2024-09-19 RX ORDER — AMOXICILLIN AND CLAVULANATE POTASSIUM 875; 125 MG/1; MG/1
875 TABLET, FILM COATED ORAL 2 TIMES DAILY
Qty: 20 TABLET | Refills: 0 | Status: SHIPPED | OUTPATIENT
Start: 2024-09-19 | End: 2024-09-29

## 2024-09-19 RX ORDER — OFLOXACIN 3 MG/ML
10 SOLUTION AURICULAR (OTIC) DAILY
Qty: 0.35 ML | Refills: 0 | Status: SHIPPED | OUTPATIENT
Start: 2024-09-19 | End: 2024-09-26

## 2024-09-19 RX ORDER — TACROLIMUS 0.3 MG/G
OINTMENT TOPICAL 2 TIMES DAILY
COMMUNITY

## 2024-09-19 ASSESSMENT — ENCOUNTER SYMPTOMS
VOMITING: 0
DIFFICULTY URINATING: 0
FEVER: 0
RHINORRHEA: 1
WHEEZING: 0
SHORTNESS OF BREATH: 0
SORE THROAT: 0
DIARRHEA: 0
NAUSEA: 0

## 2024-09-19 NOTE — PROGRESS NOTES
Subjective   Patient ID: Mukul Plunkett is a 39 y.o. male who presents for Ear Fullness.    Ear Fullness   There is pain in the left ear. Episode onset: 1 week. There has been no fever. Associated symptoms include rhinorrhea. Pertinent negatives include no diarrhea, ear discharge, sore throat or vomiting. Associated symptoms comments: Foreign body sensation.        Review of Systems   Constitutional:  Negative for fever.   HENT:  Positive for rhinorrhea. Negative for congestion, ear discharge, ear pain and sore throat.         Muffled hearing left ear  Feels like something is in left ear  Has hx allergies   Respiratory:  Negative for shortness of breath and wheezing.    Cardiovascular:  Negative for chest pain.   Gastrointestinal:  Negative for diarrhea, nausea and vomiting.   Genitourinary:  Negative for difficulty urinating.       Objective   /80   Pulse 78   Temp 36.7 °C (98 °F)   Resp 16   Wt 89.8 kg (198 lb)   SpO2 98%   BMI 28.41 kg/m²     Physical Exam  Vitals and nursing note reviewed.   Constitutional:       General: He is not in acute distress.     Appearance: Normal appearance.   HENT:      Head: Normocephalic and atraumatic.      Right Ear: Tympanic membrane, ear canal and external ear normal.      Left Ear: External ear normal.      Ears:      Comments: Left canal with edema, left tm is dull and thick     Nose: Nose normal.      Mouth/Throat:      Mouth: Mucous membranes are moist.      Pharynx: Oropharynx is clear.   Cardiovascular:      Rate and Rhythm: Normal rate and regular rhythm.      Heart sounds: Normal heart sounds.   Pulmonary:      Effort: Pulmonary effort is normal.      Breath sounds: Normal breath sounds.   Musculoskeletal:      Cervical back: Neck supple.   Lymphadenopathy:      Cervical: No cervical adenopathy.   Skin:     General: Skin is warm and dry.   Neurological:      Mental Status: He is alert.         Assessment/Plan   Problem List Items Addressed This Visit              ICD-10-CM    Acute otitis media, left - Primary H66.92     Advise pt to take med as directed  F/up if no improvement         Relevant Medications    amoxicillin-pot clavulanate (Augmentin) 875-125 mg tablet    Acute swimmer's ear of left side H60.332     Advise pt to use ear gtts as directed  Avoid water in ear  Do not use q tips in ear  F/up if no improvement         Relevant Medications    ofloxacin (Floxin) 0.3 % otic solution

## 2024-09-19 NOTE — ASSESSMENT & PLAN NOTE
Advise pt to use ear gtts as directed  Avoid water in ear  Do not use q tips in ear  F/up if no improvement

## 2024-10-01 ENCOUNTER — OFFICE VISIT (OUTPATIENT)
Dept: PRIMARY CARE | Facility: CLINIC | Age: 39
End: 2024-10-01
Payer: COMMERCIAL

## 2024-10-01 VITALS
OXYGEN SATURATION: 98 % | TEMPERATURE: 99.5 F | BODY MASS INDEX: 28.93 KG/M2 | RESPIRATION RATE: 20 BRPM | WEIGHT: 201.6 LBS | SYSTOLIC BLOOD PRESSURE: 120 MMHG | HEART RATE: 76 BPM | DIASTOLIC BLOOD PRESSURE: 80 MMHG

## 2024-10-01 DIAGNOSIS — H66.001 NON-RECURRENT ACUTE SUPPURATIVE OTITIS MEDIA OF RIGHT EAR WITHOUT SPONTANEOUS RUPTURE OF TYMPANIC MEMBRANE: Primary | ICD-10-CM

## 2024-10-01 DIAGNOSIS — H60.503 ACUTE OTITIS EXTERNA OF BOTH EARS, UNSPECIFIED TYPE: ICD-10-CM

## 2024-10-01 PROCEDURE — 99213 OFFICE O/P EST LOW 20 MIN: CPT | Performed by: NURSE PRACTITIONER

## 2024-10-01 PROCEDURE — 1036F TOBACCO NON-USER: CPT | Performed by: NURSE PRACTITIONER

## 2024-10-01 RX ORDER — OFLOXACIN 3 MG/ML
5 SOLUTION AURICULAR (OTIC) 2 TIMES DAILY
Qty: 0.35 ML | Refills: 0 | Status: SHIPPED | OUTPATIENT
Start: 2024-10-01 | End: 2024-10-08

## 2024-10-01 ASSESSMENT — ENCOUNTER SYMPTOMS
CONSTIPATION: 0
NAUSEA: 0
SORE THROAT: 0
ACTIVITY CHANGE: 0
VOMITING: 0
CHILLS: 0
FATIGUE: 0
DIARRHEA: 0
APPETITE CHANGE: 0
FEVER: 0
COUGH: 0

## 2024-10-01 NOTE — PROGRESS NOTES
Subjective   Patient ID: Mukul Plunkett is a 39 y.o. male who presents for Cerumen Impaction (Left ear/).    L ear fullness  Feels like something is stuck    Last week dx w/inner and outer ear infection  Took Augmentin/ofloxacin  Improved, but thinks it is coming back    Uses ear buds at work  Has been using ear plugs while in the shower  Also, using debrox         Review of Systems   Constitutional:  Negative for activity change, appetite change, chills, fatigue and fever.   HENT:  Positive for ear pain. Negative for congestion, ear discharge and sore throat.    Respiratory:  Negative for cough.    Gastrointestinal:  Negative for constipation, diarrhea, nausea and vomiting.       Objective   /80   Pulse 76   Temp 37.5 °C (99.5 °F)   Resp 20   Wt 91.4 kg (201 lb 9.6 oz)   SpO2 98%   BMI 28.93 kg/m²     Physical Exam  Vitals reviewed.   Constitutional:       General: He is not in acute distress.     Appearance: Normal appearance. He is not ill-appearing or toxic-appearing.   HENT:      Head: Normocephalic.      Right Ear: Laceration and tenderness present.      Left Ear: Tenderness present.      Ears:      Comments: R ear canal with tenderness and dried blood noted in canal  L ear with mild tenderness     Nose: Mucosal edema, congestion and rhinorrhea present. Rhinorrhea is clear.      Right Turbinates: Swollen.      Left Turbinates: Swollen.      Mouth/Throat:      Lips: Pink.      Mouth: Mucous membranes are moist.      Pharynx: Posterior oropharyngeal erythema present.   Eyes:      Extraocular Movements: Extraocular movements intact.      Conjunctiva/sclera: Conjunctivae normal.      Pupils: Pupils are equal, round, and reactive to light.   Cardiovascular:      Rate and Rhythm: Normal rate and regular rhythm.      Pulses: Normal pulses.      Heart sounds: Normal heart sounds.   Pulmonary:      Effort: Pulmonary effort is normal.      Breath sounds: Normal breath sounds.   Skin:     General: Skin is warm.       Capillary Refill: Capillary refill takes less than 2 seconds.   Neurological:      General: No focal deficit present.      Mental Status: He is alert and oriented to person, place, and time.   Psychiatric:         Mood and Affect: Mood normal.         Behavior: Behavior normal.         Assessment/Plan   Diagnoses and all orders for this visit:  Non-recurrent acute suppurative otitis media of right ear without spontaneous rupture of tympanic membrane  -     Referral to ENT; Future  Acute otitis externa of both ears, unspecified type  -     ofloxacin (Floxin) 0.3 % otic solution; Administer 5 drops into each ear 2 times a day for 7 days.    Referral made to ENT for follow up  Use ears drops x7 days  Continue with allergy med and preferred nasal spray  Encouraged alternate ear device such as headphones instead of ear buds  Follow up with PCP as needed  ER for any loss of hearing or worsening of symptoms

## 2024-10-07 ENCOUNTER — APPOINTMENT (OUTPATIENT)
Dept: OTOLARYNGOLOGY | Facility: CLINIC | Age: 39
End: 2024-10-07
Payer: COMMERCIAL

## 2024-10-07 DIAGNOSIS — H66.92 ACUTE OTITIS MEDIA, LEFT: Primary | ICD-10-CM

## 2024-10-07 DIAGNOSIS — J30.89 ALLERGIC RHINITIS DUE TO DUST MITE: ICD-10-CM

## 2024-10-07 DIAGNOSIS — J30.1 SEASONAL ALLERGIC RHINITIS DUE TO POLLEN: ICD-10-CM

## 2024-10-07 PROCEDURE — 99203 OFFICE O/P NEW LOW 30 MIN: CPT | Performed by: OTOLARYNGOLOGY

## 2024-10-07 ASSESSMENT — ENCOUNTER SYMPTOMS
CONSTITUTIONAL NEGATIVE: 1
RESPIRATORY NEGATIVE: 1
CARDIOVASCULAR NEGATIVE: 1
NEUROLOGICAL NEGATIVE: 1

## 2024-10-07 NOTE — PROGRESS NOTES
"Subjective   Patient ID: Karthik Plunkett \"Marshall" is a 39 y.o. male who presents for EAR INFECTION.  He is seen at the kind request of Dr. Poon.     HPI  This patient has been noted to have a history of previous septoplasty and turbinate surgery with me along with a history of severe  allergies on immunotherapy shots now for about 4 to 5 years at a minimum.  Recently had evidence of infection with fluid.  He is now here for assessment of same.  He stipulates his breathing is okay but is wanting to find out if we can do any ostomy can even further optimized.  All remaining ENT inquiry is otherwise clear.      Review of Systems   Constitutional: Negative.    HENT: Negative.     Respiratory: Negative.     Cardiovascular: Negative.    Neurological: Negative.        Physical Exam    General appearance: No acute distress. Normal facies. Symmetric facial movement. No gross lesions of the face are noted.  The external ear structures appear normal. The ear canals patent and the tympanic membranes are intact without evidence of air-fluid levels, retraction, or congenital defects.  Anterior rhinoscopy notes essentially a midline nasal septum. Examination is noted for normal healthy mucosal membranes without any evidence of lesions, polyps, or exudate. The tongue is normally mobile. There are no lesions on the gingiva, buccal, or oral mucosa. There are no oral cavity masses.  The neck is negative for mass lymphadenopathy. The trachea and parotid are clear. The thyroid bed is grossly unremarkable. The salivary gland structures are grossly unremarkable.    Assessment/Plan     1.  History of recent ear infection now with complete resolution of same.  Patient reassured.  We see nothing worrisome.  I do suspect the very high pollen counts we have had throughout this spring and summer into the fall have led to his symptomatology but he looks great.  At this juncture I do not appreciate anything worrisome so we will favor " observation.  2.  History of previous septoplasty and turbinate surgery.  Overall his nose still looks fairly good.  I do not appreciate any obvious obstruction.  He certainly would like to breathe a little bit better but he did not tolerate nasal steroids in the past and I have only recommend utilization of saline sprays to help keep the pollen counts down.  He has already tried Zyrtec which makes him very tired and so he is utilizing Allegra.  Unfortunately he does not have many other options in this regard and further surgery is certainly not warranted based on his exam.  All questions were answered in this regard accordingly.

## 2024-10-16 ENCOUNTER — APPOINTMENT (OUTPATIENT)
Dept: ALLERGY | Facility: CLINIC | Age: 39
End: 2024-10-16
Payer: COMMERCIAL

## 2024-10-16 DIAGNOSIS — J30.89 ALLERGIC RHINITIS DUE TO DUST MITE: ICD-10-CM

## 2024-10-16 DIAGNOSIS — J30.1 SEASONAL ALLERGIC RHINITIS DUE TO POLLEN: ICD-10-CM

## 2024-10-16 PROCEDURE — 95117 IMMUNOTHERAPY INJECTIONS: CPT | Performed by: ALLERGY & IMMUNOLOGY

## 2024-11-13 ENCOUNTER — APPOINTMENT (OUTPATIENT)
Dept: ALLERGY | Facility: CLINIC | Age: 39
End: 2024-11-13
Payer: COMMERCIAL

## 2024-11-13 VITALS
HEIGHT: 70 IN | RESPIRATION RATE: 17 BRPM | WEIGHT: 201 LBS | OXYGEN SATURATION: 98 % | BODY MASS INDEX: 28.77 KG/M2 | DIASTOLIC BLOOD PRESSURE: 70 MMHG | SYSTOLIC BLOOD PRESSURE: 110 MMHG | TEMPERATURE: 98.1 F | HEART RATE: 74 BPM

## 2024-11-13 DIAGNOSIS — L20.89 OTHER ATOPIC DERMATITIS: ICD-10-CM

## 2024-11-13 DIAGNOSIS — J30.89 SEASONAL ALLERGIC RHINITIS DUE TO OTHER ALLERGIC TRIGGER: Primary | ICD-10-CM

## 2024-11-13 DIAGNOSIS — K20.0 EOSINOPHILIC ESOPHAGITIS: ICD-10-CM

## 2024-11-13 DIAGNOSIS — L23.5 ALLERGIC DERMATITIS DUE TO OTHER CHEMICAL PRODUCT: ICD-10-CM

## 2024-11-13 PROCEDURE — 95117 IMMUNOTHERAPY INJECTIONS: CPT | Performed by: ALLERGY & IMMUNOLOGY

## 2024-11-13 PROCEDURE — 99214 OFFICE O/P EST MOD 30 MIN: CPT | Performed by: ALLERGY & IMMUNOLOGY

## 2024-11-13 PROCEDURE — 3008F BODY MASS INDEX DOCD: CPT | Performed by: ALLERGY & IMMUNOLOGY

## 2024-11-13 NOTE — PROGRESS NOTES
"Patient ID: Karthik Plunkett \"Marshall" is a 39 y.o. male.     Chief Complaint: Follow up  History Of Present Illness  Karthik Plunkett \"Marshall" is a 39 y.o. male with PMx allergic rhinitis to dust mite, mold, tree, grass mix, chronic sinus complaints presenting for follow up.     Food Allergy  EOE-controlled on PPI bid.  Avoids peanut and bread.    Eczema/ Atopic Dermatitis  Facial rash-pro topic and topical steroid intermittently helpful. Alternates for short periods.    Asthma  No    Rhinoconjunctivitis  Saline spray more frequently is helpful.  Maintenance immunotherapy for perennial and pollen.    Drug Allergy   Multiple contact allergens-sees dermatology as well    Insect Allergy   No    Infections  No history of frequent or recurrent infections      Review of Systems    Pertinent positives and negatives have been assessed in the HPI. All other systems have been reviewed and are negative except as noted in the HPI.    Allergies  Benzalkonium chloride, Carvone, Cocamidopropyl betaine, Dermatitis antigens, Limonene, Linalool, and Oleamidopropyl dimethylamine    Past Medical History  He has a past medical history of Eosinophilic esophagitis (07/20/2022), Personal history of other diseases of the nervous system and sense organs, and Personal history of other diseases of the respiratory system (09/09/2019).    Family History  Family History   Problem Relation Name Age of Onset    Colonic polyp Father      Other (CVA) Maternal Grandfather       Surgical History  He has a past surgical history that includes Other surgical history (09/09/2019).    Social/Environmental History  He reports that he has never smoked. He has never been exposed to tobacco smoke. He has never used smokeless tobacco. No history on file for alcohol use and drug use.    MEDICATIONS  Current Outpatient Medications on File Prior to Visit   Medication Sig Dispense Refill    cholecalciferol (Vitamin D3) 50 mcg (2,000 unit) capsule Take by mouth.      " "fexofenadine HCl (ALLEGRA ORAL) Take by mouth.      multivitamin tablet Take by mouth.      omega-3 fatty acids-fish oil (One-Per-Day Omega-3) 684-1,200 mg capsule Take by mouth.      omeprazole (PriLOSEC) 40 mg DR capsule Take 1 capsule (40 mg) by mouth once daily in the morning. Take before meals.  30 mintues before breakfast.      tacrolimus (Protopic) 0.03 % ointment Apply topically 2 times a day.      triamcinolone (Kenalog) 0.025 % ointment Apply topically 2 times a day. Apply twice a day until the lesions are flat and smooth. Do not use longer than 14 days at a time - if not resolving the lesions after 14 days, please let us know. 80 g 2    azelastine (Astelin) 137 mcg (0.1 %) nasal spray Administer 2 sprays into each nostril 2 times a day. Use in each nostril as directed (Patient not taking: Reported on 11/13/2024) 30 mL 5     No current facility-administered medications on file prior to visit.         Physical Exam  Visit Vitals  /70   Pulse 74   Temp 36.7 °C (98.1 °F) (Temporal)   Resp 17   Ht 1.778 m (5' 10\")   Wt 91.2 kg (201 lb)   SpO2 98%   BMI 28.84 kg/m²   Smoking Status Never   BSA 2.12 m²       Wt Readings from Last 1 Encounters:   11/13/24 91.2 kg (201 lb)       Physical Exam    General: Well appearing, no acute distress  Head: Normocephalic, atraumatic, neck supple without lymphadenopathy  Eyes: EOMI, non-injected  Nose: No nasal crease, nares patent, slightly boggy turbinates, minimal discharge  Throat: Normal dentition, no erythema  Heart: Regular rate and rhythm  Lungs: Clear to auscultation bilaterally, effort normal  Abdomen: Soft, non-tender, normal bowel sounds  Extremities: Moves all extremities symmetrically, no edema  Skin: Faint facial erythema.  Psych: normal mood and affect    LAB RESULTS:  CBC:  Recent Labs     08/23/19  1402   WBC 7.8   HGB 16.3   HCT 50.1      MCV 91       CMP:  Recent Labs     09/24/21  1058 08/23/19  1402    139   K 3.7 3.8    104   CO2 " 24 28   ANIONGAP 13 11   BUN 15 17   CREATININE 1.14 1.09   MG  --  2.10     Recent Labs     08/23/19  1402   ALBUMIN 4.6   ALKPHOS 68   ALT 15   AST 15   BILITOT 1.0       ALLERGY:   Lab Results   Component Value Date    ICIGE 129.0 (H) 09/04/2019    WHITEASH 0.44 (A) 09/04/2019    SILVERBIRCH <0.35 09/04/2019    BOXELDER <0.35 09/04/2019    MOUNTJUNIPER <0.35 09/04/2019    COTTONWOOD <0.35 09/04/2019    ELM <0.35 09/04/2019    MULBERRY <0.35 09/04/2019    PECANHICKORY <0.35 09/04/2019    MAPLESYCAMOR <0.35 09/04/2019    OAK <0.35 09/04/2019    BERMUDAGR 0.55 (A) 09/04/2019    JOHNSONGR <0.35 09/04/2019    BLUEGRASS 3.19 (A) 09/04/2019    TIMOTHYGRASS 1.88 (A) 09/04/2019     Lab Results   Component Value Date    LAMBQUART <0.35 09/04/2019    PIGWEED <0.35 09/04/2019    COMRAGWEED <0.35 09/04/2019    SHEEPSOR <0.35 09/04/2019    PLANTAIN <0.35 09/04/2019    CATEPI <0.35 09/04/2019    DOGEPI <0.35 09/04/2019    ALTERNA 6.39 (A) 09/04/2019    CLADHERB <0.35 09/04/2019    ICA04 <0.35 09/04/2019    DERMFAR <0.35 09/04/2019    DERMPTE <0.35 09/04/2019    COCKR <0.35 09/04/2019       Recent Labs     09/04/19  1200   ICIGE 129.0*         IMMUNO:   Recent Labs     11/03/21  1633   IGG 1,250      IGM 93       HEME/ENDO:  Recent Labs     08/23/19  1402   TSH 1.20     Immunotherapy administered today. Pt received 0.5mL in L and R arms today, tolerating the procedure well without any adverse reaction. Sara Bobby LPN    Assessment/Plan   Mukul is a 40 yo with a history of allergic rhinitis on immunotherapy and doing well. He has a history of contact and atopic dermatitis and EOE.  Overall stable with current plan. He does have some breakthrough symptoms with his skin. Previously discussed using biologic.  Hold off for now and monitor.  Follow up in 6 months    Mercy Stern,

## 2024-12-11 ENCOUNTER — APPOINTMENT (OUTPATIENT)
Dept: ALLERGY | Facility: CLINIC | Age: 39
End: 2024-12-11
Payer: COMMERCIAL

## 2024-12-11 DIAGNOSIS — J30.1 SEASONAL ALLERGIC RHINITIS DUE TO POLLEN: ICD-10-CM

## 2024-12-11 DIAGNOSIS — J30.89 ALLERGIC RHINITIS DUE TO DUST MITE: ICD-10-CM

## 2024-12-11 PROCEDURE — 95117 IMMUNOTHERAPY INJECTIONS: CPT | Performed by: ALLERGY & IMMUNOLOGY

## 2025-01-08 ENCOUNTER — APPOINTMENT (OUTPATIENT)
Dept: ALLERGY | Facility: CLINIC | Age: 40
End: 2025-01-08
Payer: COMMERCIAL

## 2025-01-08 DIAGNOSIS — J30.1 SEASONAL ALLERGIC RHINITIS DUE TO POLLEN: ICD-10-CM

## 2025-01-08 DIAGNOSIS — J30.89 ALLERGIC RHINITIS DUE TO DUST MITE: ICD-10-CM

## 2025-01-08 PROCEDURE — 95117 IMMUNOTHERAPY INJECTIONS: CPT | Performed by: ALLERGY & IMMUNOLOGY

## 2025-02-05 ENCOUNTER — APPOINTMENT (OUTPATIENT)
Dept: ALLERGY | Facility: CLINIC | Age: 40
End: 2025-02-05
Payer: COMMERCIAL

## 2025-02-05 DIAGNOSIS — J30.1 SEASONAL ALLERGIC RHINITIS DUE TO POLLEN: ICD-10-CM

## 2025-02-05 DIAGNOSIS — J30.89 ALLERGIC RHINITIS DUE TO DUST MITE: ICD-10-CM

## 2025-02-05 PROCEDURE — 95117 IMMUNOTHERAPY INJECTIONS: CPT | Performed by: ALLERGY & IMMUNOLOGY

## 2025-03-05 ENCOUNTER — APPOINTMENT (OUTPATIENT)
Dept: ALLERGY | Facility: CLINIC | Age: 40
End: 2025-03-05
Payer: COMMERCIAL

## 2025-03-05 DIAGNOSIS — J30.1 SEASONAL ALLERGIC RHINITIS DUE TO POLLEN: ICD-10-CM

## 2025-03-05 DIAGNOSIS — J30.89 ALLERGIC RHINITIS DUE TO DUST MITE: ICD-10-CM

## 2025-03-05 PROCEDURE — 95117 IMMUNOTHERAPY INJECTIONS: CPT | Performed by: ALLERGY & IMMUNOLOGY

## 2025-04-02 ENCOUNTER — APPOINTMENT (OUTPATIENT)
Dept: ALLERGY | Facility: CLINIC | Age: 40
End: 2025-04-02
Payer: COMMERCIAL

## 2025-04-02 DIAGNOSIS — J30.89 ALLERGIC RHINITIS DUE TO DUST MITE: ICD-10-CM

## 2025-04-02 DIAGNOSIS — J30.1 SEASONAL ALLERGIC RHINITIS DUE TO POLLEN: ICD-10-CM

## 2025-04-02 PROCEDURE — 95117 IMMUNOTHERAPY INJECTIONS: CPT | Performed by: ALLERGY & IMMUNOLOGY

## 2025-04-30 ENCOUNTER — APPOINTMENT (OUTPATIENT)
Dept: ALLERGY | Facility: CLINIC | Age: 40
End: 2025-04-30
Payer: COMMERCIAL

## 2025-04-30 DIAGNOSIS — J30.1 SEASONAL ALLERGIC RHINITIS DUE TO POLLEN: ICD-10-CM

## 2025-04-30 DIAGNOSIS — J30.89 ALLERGIC RHINITIS DUE TO DUST MITE: ICD-10-CM

## 2025-04-30 PROCEDURE — 95117 IMMUNOTHERAPY INJECTIONS: CPT | Performed by: ALLERGY & IMMUNOLOGY

## 2025-05-02 ENCOUNTER — APPOINTMENT (OUTPATIENT)
Dept: PRIMARY CARE | Facility: CLINIC | Age: 40
End: 2025-05-02
Payer: COMMERCIAL

## 2025-05-02 NOTE — PROGRESS NOTES
Here for routine allergy shots.;L         Area cleansed with alcohol.  Subcutaneous injection performed in clean fashion. Patient tolerated well without adverse reaction.

## 2025-05-09 ENCOUNTER — OFFICE VISIT (OUTPATIENT)
Dept: URGENT CARE | Age: 40
End: 2025-05-09
Payer: COMMERCIAL

## 2025-05-09 VITALS
TEMPERATURE: 98.4 F | BODY MASS INDEX: 28.43 KG/M2 | WEIGHT: 198.6 LBS | HEIGHT: 70 IN | SYSTOLIC BLOOD PRESSURE: 132 MMHG | HEART RATE: 80 BPM | RESPIRATION RATE: 18 BRPM | OXYGEN SATURATION: 98 % | DIASTOLIC BLOOD PRESSURE: 83 MMHG

## 2025-05-09 DIAGNOSIS — M54.42 ACUTE BACK PAIN WITH SCIATICA, LEFT: Primary | ICD-10-CM

## 2025-05-09 PROBLEM — R09.81 NASAL CONGESTION: Status: ACTIVE | Noted: 2025-05-09

## 2025-05-09 PROBLEM — J30.9 ALLERGIC RHINITIS: Status: ACTIVE | Noted: 2025-05-09

## 2025-05-09 RX ORDER — CYCLOBENZAPRINE HCL 5 MG
5 TABLET ORAL 3 TIMES DAILY PRN
Qty: 20 TABLET | Refills: 0 | Status: SHIPPED | OUTPATIENT
Start: 2025-05-09

## 2025-05-09 RX ORDER — IBUPROFEN 800 MG/1
800 TABLET ORAL EVERY 8 HOURS PRN
Qty: 30 TABLET | Refills: 0 | Status: SHIPPED | OUTPATIENT
Start: 2025-05-09

## 2025-05-09 RX ORDER — METHYLPREDNISOLONE 4 MG/1
TABLET ORAL
Qty: 21 TABLET | Refills: 0 | Status: SHIPPED | OUTPATIENT
Start: 2025-05-09

## 2025-05-09 RX ORDER — IBUPROFEN 200 MG
200 TABLET ORAL EVERY 6 HOURS
COMMUNITY
End: 2025-05-09 | Stop reason: ALTCHOICE

## 2025-05-09 ASSESSMENT — ENCOUNTER SYMPTOMS
CONSTITUTIONAL NEGATIVE: 1
RESPIRATORY NEGATIVE: 1
NUMBNESS: 0
NEUROLOGICAL NEGATIVE: 1
WEAKNESS: 0
ARTHRALGIAS: 0
DIZZINESS: 0
COUGH: 0
GASTROINTESTINAL NEGATIVE: 1
VOMITING: 0
ABDOMINAL PAIN: 0
DIARRHEA: 0
SHORTNESS OF BREATH: 0
NAUSEA: 0
BACK PAIN: 1
WHEEZING: 0
CARDIOVASCULAR NEGATIVE: 1

## 2025-05-09 ASSESSMENT — PAIN SCALES - GENERAL: PAINLEVEL_OUTOF10: 7

## 2025-05-09 NOTE — PROGRESS NOTES
"Subjective   Patient ID: Karthik Plunkett \"Marshall" is a 39 y.o. male. They present today with a chief complaint of Injury (I injured lower back last weekend. There's pain, tightness and muscle spasms going down my left leg. I have to keep taking Ibuprofen to make things better, but I still have problems. I have a history of back pain that never got successfully treated. - Entered by patient) and Back Pain (Injured on Saturday moving heavy objects, has hx of back pain, having muscle spasms and pain radiating down left leg.).    History of Present Illness  As above. Denies fever, chills; no other constitutional complaints. No CP, dizziness, SOB. Denies any red flag symptoms; no numbness, weakness, paraesthesias, no loss of B/B.       History provided by:  Patient and medical records      Past Medical History  Allergies as of 05/09/2025 - Reviewed 05/09/2025   Allergen Reaction Noted    Benzalkonium chloride Rash 07/16/2023    Carvone Rash 07/16/2023    Cocamidopropyl betaine Rash 07/16/2023    Dermatitis antigens Rash 07/16/2023    Limonene Rash 07/16/2023    Linalool Rash 07/16/2023    Oleamidopropyl dimethylamine Rash 07/16/2023       Prescriptions Prior to Admission[1]     Medical History[2]    Surgical History[3]     reports that he has never smoked. He has never been exposed to tobacco smoke. He has never used smokeless tobacco. He reports that he does not use drugs.    Review of Systems  Review of Systems   Constitutional: Negative.    HENT: Negative.     Respiratory: Negative.  Negative for cough, shortness of breath and wheezing.    Cardiovascular: Negative.  Negative for chest pain.   Gastrointestinal: Negative.  Negative for abdominal pain, diarrhea, nausea and vomiting.   Musculoskeletal:  Positive for back pain. Negative for arthralgias and gait problem.   Skin: Negative.    Neurological: Negative.  Negative for dizziness, weakness and numbness.   All other systems reviewed and are negative.                 " "                Objective    Vitals:    05/09/25 1141   BP: 132/83   Pulse: 80   Resp: 18   Temp: 36.9 °C (98.4 °F)   TempSrc: Oral   SpO2: 98%   Weight: 90.1 kg (198 lb 9.6 oz)   Height: 1.778 m (5' 10\")     No LMP for male patient.    Physical Exam  Vitals and nursing note reviewed.   Constitutional:       General: He is not in acute distress.     Appearance: Normal appearance. He is not ill-appearing.   HENT:      Head: Normocephalic and atraumatic.   Cardiovascular:      Rate and Rhythm: Normal rate and regular rhythm.   Pulmonary:      Effort: Pulmonary effort is normal.      Breath sounds: Normal breath sounds.   Abdominal:      General: Bowel sounds are normal.      Palpations: Abdomen is soft.   Musculoskeletal:      Cervical back: Normal, normal range of motion and neck supple.      Thoracic back: Normal.      Lumbar back: Spasms and tenderness present. No bony tenderness. Decreased range of motion. Positive left straight leg raise test. Negative right straight leg raise test.   Skin:     General: Skin is warm and dry.      Capillary Refill: Capillary refill takes less than 2 seconds.   Neurological:      Mental Status: He is alert and oriented to person, place, and time.   Psychiatric:         Behavior: Behavior normal.         Procedures    Point of Care Test & Imaging Results from this visit  No results found for this visit on 05/09/25.   Imaging  No results found.    Cardiology, Vascular, and Other Imaging  No other imaging results found for the past 2 days      Diagnostic study results (if any) were reviewed by JAVIER Patiño.    Assessment/Plan   Allergies, medications, history, and pertinent labs/EKGs/Imaging reviewed by JAVIER Patiño.     Medical Decision Making  Risks, benefits, and alternatives of the medications and treatment plan prescribed today were discussed, and patient expressed understanding. Plan follow up as discussed or as needed if any worsening symptoms or " change in condition. Reinforced red flags including (but not limited to): severe or worsening pain; difficulty swallowing; stiff neck; shortness of breath; coughing or vomiting blood; chest pain; and new or increased fever are indications to go to the Emergency Department.  At time of discharge patient was clinically well-appearing and HDS for outpatient management. The patient and/or family was educated regarding diagnosis, supportive care, OTC and Rx medications. The patient and/or family was given the opportunity to ask questions prior to discharge.  They verbalized understanding of my discussion of the plans for treatment, expected course, indications to return to  or seek further evaluation in ED, and the need for timely follow up as directed.   They were provided with a work/school excuse if requested. The after-visit summary was given to the patient and care instructions were reviewed with the patient. All questions were answered and the patient verbalized understanding of the plan of care for today.  Plan:  Recent visit notes reviewed  Meds as above  Increase clear fluids  Pcp follow up this week if not improving or worsening  ER visit anytime 24/7 for acute worsening or changing condition      Orders and Diagnoses  Diagnoses and all orders for this visit:  Acute back pain with sciatica, left  -     methylPREDNISolone (Medrol Dospak) 4 mg tablets; Follow schedule on package instructions  -     ibuprofen 800 mg tablet; Take 1 tablet (800 mg) by mouth every 8 hours if needed for mild pain (1 - 3) or moderate pain (4 - 6).  -     cyclobenzaprine (Flexeril) 5 mg tablet; Take 1 tablet (5 mg) by mouth 3 times a day as needed for muscle spasms.      Medical Admin Record      Patient disposition: Home    Electronically signed by JAVIER Patiño  4:37 PM           [1] (Not in a hospital admission)   [2]   Past Medical History:  Diagnosis Date    Eosinophilic esophagitis 07/20/2022    Eosinophilic  esophagitis    Personal history of other diseases of the nervous system and sense organs     History of sleep apnea    Personal history of other diseases of the respiratory system 09/09/2019    History of allergic rhinitis   [3]   Past Surgical History:  Procedure Laterality Date    OTHER SURGICAL HISTORY  09/09/2019    Nasal septoplasty

## 2025-05-13 NOTE — PROGRESS NOTES
"Subjective   Patient ID: Karthik Plunkett \"Marshall" is a 39 y.o. male who presents for Establish Care and Back Pain.    HPI entered by Medical Assistant and reviewed/updated by myself.    Establish care  Last PCP -- [facility] PCP Dr. Chambers  Reason for leaving -- \"did not need to be seen\"   How did you hear about us -- called into the call center.     Patient presents in office for back pain. Ongoing x 2 weeks. Patient admits that he did hurt his back while spring cleaning. He could see and feel \"rippling\"spasm of his left outer quad muscle.Patient admits that he did go to urgent care and was told to establish with a PCP. Patient was prescribed ibuprofen 800 mg, cyclobenzaprine 5 mg and a medrol dosepak. Pain is \"not too bad right now.\" Patient admits that he is feeling pain/weakness in the left leg.     Back has been bothering him since 2018. He stood up from a chair at the end of the day with sudden onset of pain. No known initial injury. He has completed PT multiple times without any relief, most recently spring 2024.  He has been to [place] for acupuncture without any relief. Pain fluctuates from spine,lower ribs, legs. Has been to chiropractor.     Has been to ortho/sports and spine medicine, most recently with Dr. Rocío Jaeger at [facility] 3/6/24:    INTERIM HISTORY:  Since the last visit, the patient states the symptoms have not changed. The distribution of painful symptoms has has not changed. Patient describes the pain as aching, moderate, radiating, sharp, soreness, stiff, and tenderness. Over a 24-hour period of time, the pain is worst across the whole day and when performing certain activities. The pain is exacerbated by activity and physical therapy. Patient notes that the pain is reduced by none specific seen by orthopedic in the past he was prescribed meloxicam tried it as needed for pain. Interim treatments have included: physical therapy and exercises.     PLAN:  Long " discussion with the patient treatment options  Due to persistent lower back unable to tolerate physical therapy MRI of the lumbar spine to rule out lumbar disc herniation as he complains of left anterior thigh pain  Call after MRI to review the results  Discussed pain management options both pain and seizure for possible lumbar spinal injections pars blocks and also chronic pain program  Recommend medical management we will discuss more after MRI of lumbar spine  Recheck in 1 to 2 months to as needed virtual visit okay after MRI     Patient states he was not able to get MRI in 2024 as ordered by Dr. Jaeger because his insurance was requiring physical therapy again.  He states he did try to attend physical therapy, but had to stop going after 2 sessions because his pain got significantly worse, as it has with all previous attempts at physical therapy including times he has completed the entire course.  He stopped following up at that time.    Has been getting rashes on his face for the last two years. Saw Allergy Dr. Weiss without conclusive results. Did change products with some relief. Hx EOE, not currently on omeprazole per Yun Yun. Worsened by bread and milk. Follows with GI as well. Also has dry eyes. States Dr. Weiss recommended Dupixent but he declined.    ARMANDO and some other autoimmune testing negative in 2023.  Has not had testing for inflammatory/rheumatoid arthritis.    Urgent Care Note 5/9/2025  Pcp follow up this week if not improving or worsening  ER visit anytime 24/7 for acute worsening or changing condition  Orders and Diagnoses  Diagnoses and all orders for this visit:  Acute back pain with sciatica, left  -     methylPREDNISolone (Medrol Dospak) 4 mg tablets; Follow schedule on package instructions  -     ibuprofen 800 mg tablet; Take 1 tablet (800 mg) by mouth every 8 hours if needed for mild pain (1 - 3) or moderate pain (4 - 6).  -     cyclobenzaprine (Flexeril) 5 mg tablet; Take  "1 tablet (5 mg) by mouth 3 times a day as needed for muscle spasms.    The patient's allergies, medications, and history were reviewed with them today and updated as indicated.    Review of Systems   Constitutional:  Negative for appetite change, chills, diaphoresis, fatigue, fever and unexpected weight change.   Respiratory:  Negative for cough, chest tightness, shortness of breath and wheezing.    Cardiovascular:  Negative for chest pain, palpitations and leg swelling.   Gastrointestinal:  Negative for abdominal pain, constipation and diarrhea.   Musculoskeletal:  Positive for arthralgias, back pain and gait problem. Negative for myalgias.   Skin:  Negative for rash and wound.   Allergic/Immunologic: Positive for environmental allergies.   Neurological:  Positive for weakness (left leg). Negative for dizziness, syncope, facial asymmetry and headaches.   Psychiatric/Behavioral:  Negative for confusion. The patient is not nervous/anxious.       Objective   Vital Signs: /80   Pulse 90 Comment: manual  Resp 18   Ht 1.778 m (5' 10\")   Wt 91.2 kg (201 lb)   BMI 28.84 kg/m²     Physical Exam  Vitals and nursing note reviewed.   Constitutional:       General: He is not in acute distress.     Appearance: Normal appearance. He is not ill-appearing.   HENT:      Head: Normocephalic and atraumatic.      Right Ear: External ear normal.      Left Ear: External ear normal.      Nose: No congestion or rhinorrhea.      Mouth/Throat:      Mouth: Mucous membranes are moist.      Pharynx: No oropharyngeal exudate or posterior oropharyngeal erythema.   Eyes:      General:         Right eye: No discharge.         Left eye: No discharge.      Extraocular Movements: Extraocular movements intact.      Conjunctiva/sclera: Conjunctivae normal.      Pupils: Pupils are equal, round, and reactive to light.   Cardiovascular:      Rate and Rhythm: Normal rate and regular rhythm.      Heart sounds: No murmur heard.  Pulmonary:      " Effort: Pulmonary effort is normal. No respiratory distress.      Breath sounds: Normal breath sounds and air entry.   Abdominal:      General: Bowel sounds are normal.      Palpations: Abdomen is soft.   Musculoskeletal:      Cervical back: Normal.      Thoracic back: Normal.      Lumbar back: Spasms and tenderness present. Decreased range of motion. Positive left straight leg raise test. Negative right straight leg raise test.      Right lower leg: No edema.      Left lower leg: No edema.      Comments: Decreased strength left leg compared to right.   Skin:     General: Skin is warm and dry.      Coloration: Skin is not jaundiced.      Findings: No erythema or rash.   Neurological:      General: No focal deficit present.      Mental Status: He is alert. Mental status is at baseline.      Sensory: Sensation is intact.      Deep Tendon Reflexes:      Reflex Scores:       Patellar reflexes are 1+ on the right side and 2+ on the left side.       Achilles reflexes are 1+ on the right side and 2+ on the left side.  Psychiatric:         Mood and Affect: Mood normal.         Behavior: Behavior normal.         Thought Content: Thought content normal.     POCT today: No results found for this visit on 05/14/25.     Assessment & Plan  Diagnoses and all orders for this visit:    Encounter to establish care  First visit to [facility]. Reviewed usual process for reviewing results, follow up care, scheduling sick visits as needed. I am in the office Tuesday-Friday most weeks. MyChart or calling the office directly at [phone], option 2, are the best ways to reach me and/or schedule appointments.     Routine general medical examination at a health care facility  Encounter for screening for lipid disorder  Health maintenance reviewed and updated.   -     Comprehensive Metabolic Panel; Future  -     CBC and Auto Differential; Future  -     Lipid Panel; Future    Acute left-sided low back pain with left-sided  sciatica  Chronic left-sided low back pain with left-sided sciatica  L4-L5 disc bulge  Sudden onset back pain in 2018 nonresponsive to multiple rounds of completed physical therapy, with most recent attempt March 2024.  He was not able to complete PT at that time because it was severely worsening his pain.  He has had 1 MRI in 2019 demonstrating grade 1 anterolisthesis of L5 on S1 and retrolisthesis of L4 on L5  with spondylolysis of L5, degenerative changes of the lumbar spine at L4-5 and L5-S1.  He was hesitant to pursue surgical options at that time due to concern for ongoing/incomplete relief from chronic pain.  Chronic pain was recently exacerbated with some work around the house.  He is currently experiencing weakness, numbness, tingling in his left leg which is very slowly improving after Medrol kit given in urgent care on 5/9/2025.  No cauda equina symptoms at this time.  Advised treatment with higher dose of steroids including in-office Kenalog 80 mg followed by prednisone 40 mg / 12-day taper.  Heating pad with auto-shut off timer to lower back.  Repeat MRI to evaluate current status of lower back.  Labs as ordered for possible inflammatory arthritis component, given patient has never been able to identify any acute injury/cause of his pain.  Had previous autoimmune testing due to facial rash/dry eyes with negative ARMANDO.  -     predniSONE (Deltasone) 10 mg tablet; Take 4 tablets (40 mg) by mouth once daily for 3 days, THEN 3 tablets (30 mg) once daily for 3 days, THEN 2 tablets (20 mg) once daily for 3 days, THEN 1 tablet (10 mg) once daily for 3 days.  -     triamcinolone acetonide (Kenalog-40) injection 80 mg  -     cyclobenzaprine (Flexeril) 5 mg tablet; Take 1 tablet (5 mg) by mouth 3 times a day as needed for muscle spasms.  -     HLAB27 Typing; Future  -     Rheumatoid Factor; Future  -     Sedimentation Rate; Future  -     MR lumbar spine wo IV contrast; Future  -     HLAB27 Typing; Future    Vitamin  D deficiency  -     Vitamin D 25-Hydroxy,Total (for eval of Vitamin D levels); Future    Allergic rhinitis due to dust mite  Seasonal allergic rhinitis due to pollen  Follows with  allergist Dr. Weiss.  Was advised biologic treatment for severe allergies plus eosinophilic esophagitis, but declined.    Eosinophilic esophagitis  Follows with [facility] and GI.  Was taken off of PPI, as he was told it was preventing him from absorbing nutrients to help heal his back issues.  He has not noticed any worsening of his esophagitis symptoms without it.  -     CBC and Auto Differential; Future    Chronic insomnia  Reports he is sleeping better since he has started using Flexeril for back pain.  Has undergone significant sleep testing in the past.    Will address sleep issues at a future visit.    Reviewed/discussed treatment options and health maintenance. Take medications as prescribed. We will contact you with the results of any ordered testing; please send a [employer] message or call the office if you do not hear from us. Follow up in the office with me in 6-8 weeks and as needed. Return sooner if symptoms do not resolve as expected.     Autumn Odom, SHEILA-CNP, DNP  Family Nurse Practitioner  [facility]

## 2025-05-14 ENCOUNTER — APPOINTMENT (OUTPATIENT)
Dept: PRIMARY CARE | Facility: CLINIC | Age: 40
End: 2025-05-14
Payer: COMMERCIAL

## 2025-05-14 VITALS
SYSTOLIC BLOOD PRESSURE: 120 MMHG | DIASTOLIC BLOOD PRESSURE: 80 MMHG | RESPIRATION RATE: 18 BRPM | WEIGHT: 201 LBS | HEART RATE: 90 BPM | HEIGHT: 70 IN | BODY MASS INDEX: 28.77 KG/M2

## 2025-05-14 DIAGNOSIS — F51.04 CHRONIC INSOMNIA: ICD-10-CM

## 2025-05-14 DIAGNOSIS — Z00.00 ROUTINE GENERAL MEDICAL EXAMINATION AT A HEALTH CARE FACILITY: ICD-10-CM

## 2025-05-14 DIAGNOSIS — M54.42 CHRONIC LEFT-SIDED LOW BACK PAIN WITH LEFT-SIDED SCIATICA: ICD-10-CM

## 2025-05-14 DIAGNOSIS — Z76.89 ENCOUNTER TO ESTABLISH CARE: Primary | ICD-10-CM

## 2025-05-14 DIAGNOSIS — J30.89 ALLERGIC RHINITIS DUE TO DUST MITE: ICD-10-CM

## 2025-05-14 DIAGNOSIS — E55.9 VITAMIN D DEFICIENCY: ICD-10-CM

## 2025-05-14 DIAGNOSIS — M51.369 L4-L5 DISC BULGE: ICD-10-CM

## 2025-05-14 DIAGNOSIS — M54.42 ACUTE LEFT-SIDED LOW BACK PAIN WITH LEFT-SIDED SCIATICA: ICD-10-CM

## 2025-05-14 DIAGNOSIS — G89.29 CHRONIC LEFT-SIDED LOW BACK PAIN WITH LEFT-SIDED SCIATICA: ICD-10-CM

## 2025-05-14 DIAGNOSIS — K20.0 EOSINOPHILIC ESOPHAGITIS: ICD-10-CM

## 2025-05-14 DIAGNOSIS — Z13.220 ENCOUNTER FOR SCREENING FOR LIPID DISORDER: ICD-10-CM

## 2025-05-14 DIAGNOSIS — J30.1 SEASONAL ALLERGIC RHINITIS DUE TO POLLEN: ICD-10-CM

## 2025-05-14 PROBLEM — R03.0 ELEVATED BLOOD PRESSURE READING: Status: RESOLVED | Noted: 2023-10-10 | Resolved: 2025-05-14

## 2025-05-14 PROBLEM — J30.9 ALLERGIC RHINITIS: Status: RESOLVED | Noted: 2025-05-09 | Resolved: 2025-05-14

## 2025-05-14 PROBLEM — L24.9 IRRITANT DERMATITIS: Status: RESOLVED | Noted: 2023-10-10 | Resolved: 2025-05-14

## 2025-05-14 PROBLEM — H66.92 ACUTE OTITIS MEDIA, LEFT: Status: RESOLVED | Noted: 2024-09-19 | Resolved: 2025-05-14

## 2025-05-14 PROBLEM — H60.332 ACUTE SWIMMER'S EAR OF LEFT SIDE: Status: RESOLVED | Noted: 2024-09-19 | Resolved: 2025-05-14

## 2025-05-14 PROBLEM — M54.9 CHRONIC BACK PAIN: Status: RESOLVED | Noted: 2023-10-10 | Resolved: 2025-05-14

## 2025-05-14 PROBLEM — L73.9 NASAL FOLLICULITIS: Status: RESOLVED | Noted: 2023-10-10 | Resolved: 2025-05-14

## 2025-05-14 PROBLEM — G47.9 SLEEP DISTURBANCES: Status: RESOLVED | Noted: 2023-10-10 | Resolved: 2025-05-14

## 2025-05-14 PROBLEM — R09.81 NASAL CONGESTION: Status: RESOLVED | Noted: 2025-05-09 | Resolved: 2025-05-14

## 2025-05-14 PROCEDURE — 99396 PREV VISIT EST AGE 40-64: CPT | Performed by: NURSE PRACTITIONER

## 2025-05-14 PROCEDURE — 3008F BODY MASS INDEX DOCD: CPT | Performed by: NURSE PRACTITIONER

## 2025-05-14 PROCEDURE — 96372 THER/PROPH/DIAG INJ SC/IM: CPT | Performed by: NURSE PRACTITIONER

## 2025-05-14 RX ORDER — CYCLOBENZAPRINE HCL 5 MG
5 TABLET ORAL 3 TIMES DAILY PRN
Qty: 60 TABLET | Refills: 1 | Status: SHIPPED | OUTPATIENT
Start: 2025-05-14

## 2025-05-14 RX ORDER — PREDNISONE 10 MG/1
TABLET ORAL
Qty: 30 TABLET | Refills: 0 | Status: SHIPPED | OUTPATIENT
Start: 2025-05-14 | End: 2025-05-26

## 2025-05-14 RX ORDER — TRIAMCINOLONE ACETONIDE 40 MG/ML
80 INJECTION, SUSPENSION INTRA-ARTICULAR; INTRAMUSCULAR ONCE
Status: COMPLETED | OUTPATIENT
Start: 2025-05-14 | End: 2025-05-14

## 2025-05-14 RX ADMIN — TRIAMCINOLONE ACETONIDE 80 MG: 40 INJECTION, SUSPENSION INTRA-ARTICULAR; INTRAMUSCULAR at 12:06

## 2025-05-15 PROBLEM — G89.29 CHRONIC LEFT-SIDED LOW BACK PAIN WITH LEFT-SIDED SCIATICA: Status: ACTIVE | Noted: 2025-05-15

## 2025-05-15 PROBLEM — M54.42 CHRONIC LEFT-SIDED LOW BACK PAIN WITH LEFT-SIDED SCIATICA: Status: ACTIVE | Noted: 2025-05-15

## 2025-05-15 ASSESSMENT — ENCOUNTER SYMPTOMS
NERVOUS/ANXIOUS: 0
DIAPHORESIS: 0
FACIAL ASYMMETRY: 0
BACK PAIN: 1
COUGH: 0
WEAKNESS: 1
ABDOMINAL PAIN: 0
FATIGUE: 0
SHORTNESS OF BREATH: 0
UNEXPECTED WEIGHT CHANGE: 0
WOUND: 0
WHEEZING: 0
CHILLS: 0
CONFUSION: 0
HEADACHES: 0
CHEST TIGHTNESS: 0
CONSTIPATION: 0
DIARRHEA: 0
ARTHRALGIAS: 1
PALPITATIONS: 0
APPETITE CHANGE: 0
FEVER: 0
DIZZINESS: 0
MYALGIAS: 0

## 2025-05-22 ENCOUNTER — TELEPHONE (OUTPATIENT)
Dept: PRIMARY CARE | Facility: CLINIC | Age: 40
End: 2025-05-22
Payer: COMMERCIAL

## 2025-05-28 ENCOUNTER — APPOINTMENT (OUTPATIENT)
Dept: ALLERGY | Facility: CLINIC | Age: 40
End: 2025-05-28
Payer: COMMERCIAL

## 2025-05-28 VITALS
RESPIRATION RATE: 17 BRPM | DIASTOLIC BLOOD PRESSURE: 72 MMHG | BODY MASS INDEX: 28.77 KG/M2 | TEMPERATURE: 98.3 F | WEIGHT: 201 LBS | SYSTOLIC BLOOD PRESSURE: 114 MMHG | HEART RATE: 73 BPM | HEIGHT: 70 IN | OXYGEN SATURATION: 96 %

## 2025-05-28 DIAGNOSIS — J30.1 SEASONAL ALLERGIC RHINITIS DUE TO POLLEN: ICD-10-CM

## 2025-05-28 DIAGNOSIS — J30.89 ALLERGIC RHINITIS DUE TO DUST MITE: ICD-10-CM

## 2025-05-28 DIAGNOSIS — K21.9 GASTROESOPHAGEAL REFLUX DISEASE WITHOUT ESOPHAGITIS: ICD-10-CM

## 2025-05-28 DIAGNOSIS — K20.0 EOSINOPHILIC ESOPHAGITIS: Primary | ICD-10-CM

## 2025-05-28 NOTE — PROGRESS NOTES
"Patient ID: Karthik Plunkett \"Marshall" is a 39 y.o. male.     Chief Complaint: Follow up  History Of Present Illness  Karthik Plunkett \"Marshall" is a 39 y.o. male with PMx allergic rhinitis to dust mite, mold, tree, grass mix, chronic sinus complaints presenting for follow up.     Food Allergy  EOE-controlled on PPI bid in the past, but has discontinued.  Has followed with Dr. Barajas.  Symptoms have not returned.  Following up as needed.  Avoids peanut and bread. Limits dairy.      Eczema/ Atopic Dermatitis  Facial rash-pro topic and topical steroid intermittently helpful. Alternates for short periods.  This is now cleared-protopic/steroid intermittent  Was also on steroids for his back and it cleared it all back.    Asthma  No    Rhinoconjunctivitis  Saline spray more frequently is helpful.  Maintenance immunotherapy for perennial and pollen.  Has tried and did not prefer Astelin.  Has been tolerating his allergy shots. Having some congestion still.    Drug Allergy   Multiple contact allergens-sees dermatology as well    Insect Allergy   No    Infections  No history of frequent or recurrent infections      Review of Systems    Pertinent positives and negatives have been assessed in the HPI. All other systems have been reviewed and are negative except as noted in the HPI.    Allergies  Benzalkonium chloride, Carvone, Cocamidopropyl betaine, Dermatitis antigens, Limonene, Linalool, and Oleamidopropyl dimethylamine    Past Medical History  He has a past medical history of Eosinophilic esophagitis (07/20/2022), Personal history of other diseases of the nervous system and sense organs, and Personal history of other diseases of the respiratory system (09/09/2019).    Family History  Family History   Problem Relation Name Age of Onset    Colonic polyp Father      Other (CVA) Maternal Grandfather       Surgical History  He has a past surgical history that includes Other surgical history (09/09/2019).    Social/Environmental " "History  He reports that he has never smoked. He has never been exposed to tobacco smoke. He has never used smokeless tobacco. He reports that he does not use drugs. No history on file for alcohol use.    MEDICATIONS  Current Outpatient Medications on File Prior to Visit   Medication Sig Dispense Refill    cholecalciferol (Vitamin D3) 50 mcg (2,000 unit) capsule Take by mouth.      cyclobenzaprine (Flexeril) 5 mg tablet Take 1 tablet (5 mg) by mouth 3 times a day as needed for muscle spasms. 60 tablet 1    fexofenadine HCl (ALLEGRA ORAL) Take by mouth.      ibuprofen 800 mg tablet Take 1 tablet (800 mg) by mouth every 8 hours if needed for mild pain (1 - 3) or moderate pain (4 - 6). 30 tablet 0    multivitamin tablet Take by mouth.      omega-3 fatty acids-fish oil (One-Per-Day Omega-3) 684-1,200 mg capsule Take by mouth.      tacrolimus (Protopic) 0.03 % ointment Apply topically 2 times a day.      triamcinolone (Kenalog) 0.025 % ointment Apply topically 2 times a day. Apply twice a day until the lesions are flat and smooth. Do not use longer than 14 days at a time - if not resolving the lesions after 14 days, please let us know. 80 g 2    azelastine (Astelin) 137 mcg (0.1 %) nasal spray Administer 2 sprays into each nostril 2 times a day. Use in each nostril as directed (Patient not taking: Reported on 2025) 30 mL 5    methylPREDNISolone (Medrol Dospak) 4 mg tablets Follow schedule on package instructions 21 tablet 0    [] predniSONE (Deltasone) 10 mg tablet Take 4 tablets (40 mg) by mouth once daily for 3 days, THEN 3 tablets (30 mg) once daily for 3 days, THEN 2 tablets (20 mg) once daily for 3 days, THEN 1 tablet (10 mg) once daily for 3 days. 30 tablet 0     No current facility-administered medications on file prior to visit.         Physical Exam  Visit Vitals  /72   Pulse 73   Temp 36.8 °C (98.3 °F) (Temporal)   Resp 17   Ht 1.778 m (5' 10\")   Wt 91.2 kg (201 lb)   SpO2 96%   BMI 28.84 " kg/m²   Smoking Status Never   BSA 2.12 m²       Wt Readings from Last 1 Encounters:   05/28/25 91.2 kg (201 lb)       Physical Exam    General: Well appearing, no acute distress, nasal voice  Head: Normocephalic, atraumatic, neck supple without lymphadenopathy  Eyes: EOMI, non-injected  Nose: No nasal crease, nares patent, slightly boggy turbinates, minimal discharge  Throat: Normal dentition, no erythema  Heart: Regular rate and rhythm  Lungs: Clear to auscultation bilaterally, effort normal  Abdomen: Soft, non-tender, normal bowel sounds  Extremities: Moves all extremities symmetrically, no edema  Skin: Faint facial erythema.  Psych: normal mood and affect    LAB RESULTS:  CBC:  Recent Labs     08/23/19  1402   WBC 7.8   HGB 16.3   HCT 50.1      MCV 91       CMP:  Recent Labs     09/24/21  1058 08/23/19  1402    139   K 3.7 3.8    104   CO2 24 28   ANIONGAP 13 11   BUN 15 17   CREATININE 1.14 1.09   MG  --  2.10     Recent Labs     08/23/19  1402   ALBUMIN 4.6   ALKPHOS 68   ALT 15   AST 15   BILITOT 1.0       ALLERGY:   Lab Results   Component Value Date    ICIGE 129.0 (H) 09/04/2019    WHITEASH 0.44 (A) 09/04/2019    SILVERBIRCH <0.35 09/04/2019    BOXELDER <0.35 09/04/2019    MOUNTJUNIPER <0.35 09/04/2019    COTTONWOOD <0.35 09/04/2019    ELM <0.35 09/04/2019    MULBERRY <0.35 09/04/2019    PECANHICKORY <0.35 09/04/2019    MAPLESYCAMOR <0.35 09/04/2019    OAK <0.35 09/04/2019    BERMUDAGR 0.55 (A) 09/04/2019    JOHNSONGR <0.35 09/04/2019    BLUEGRASS 3.19 (A) 09/04/2019    TIMOTHYGRASS 1.88 (A) 09/04/2019     Lab Results   Component Value Date    LAMBQUART <0.35 09/04/2019    PIGWEED <0.35 09/04/2019    COMRAGWEED <0.35 09/04/2019    SHEEPSOR <0.35 09/04/2019    PLANTAIN <0.35 09/04/2019    CATEPI <0.35 09/04/2019    DOGEPI <0.35 09/04/2019    ALTERNA 6.39 (A) 09/04/2019    CLADHERB <0.35 09/04/2019    ICA04 <0.35 09/04/2019    DERMFAR <0.35 09/04/2019    DERMPTE <0.35 09/04/2019    COCKR  <0.35 09/04/2019       Recent Labs     09/04/19  1200   ICIGE 129.0*         IMMUNO:   Recent Labs     11/03/21  1633   IGG 1,250      IGM 93       HEME/ENDO:  Recent Labs     08/23/19  1402   TSH 1.20     Immunotherapy administered today. Pt received 0.25mL in L and R arms today, tolerating the procedure well without any adverse reaction. Reduced due to new vial. Sara Bobby LPN    Assessment/Plan   Mukul is a 38 yo with a history of allergic rhinitis on immunotherapy and doing well. He has a history of contact and atopic dermatitis and EOE.  Overall stable with current plan. Topicals are managing atopic dermatitis symptoms, but also has recently been on steroids for back pain, so this likely also helped his skin clear.  He is still having trouble with nasal congestion, no additional intervention recommended from ENT stand point with Dr. Lemons. Patient does not prefer to use any nasal sprays. He also has declined trial of Singulair for congestion.  We discussed considering CT scan if any worsening symptoms of pressure, drainage, infection or changes in smell or taste. Patient may consider this,  Follow up in 6 months    Mercy Stern DO

## 2025-06-05 LAB
25(OH)D3+25(OH)D2 SERPL-MCNC: 52 NG/ML (ref 30–100)
ALBUMIN SERPL-MCNC: 4.5 G/DL (ref 3.6–5.1)
ALP SERPL-CCNC: 67 U/L (ref 36–130)
ALT SERPL-CCNC: 38 U/L (ref 9–46)
ANION GAP SERPL CALCULATED.4IONS-SCNC: 8 MMOL/L (CALC) (ref 7–17)
AST SERPL-CCNC: 16 U/L (ref 10–40)
BASOPHILS # BLD AUTO: 28 CELLS/UL (ref 0–200)
BASOPHILS NFR BLD AUTO: 0.3 %
BILIRUB SERPL-MCNC: 1.2 MG/DL (ref 0.2–1.2)
BUN SERPL-MCNC: 23 MG/DL (ref 7–25)
CALCIUM SERPL-MCNC: 9.1 MG/DL (ref 8.6–10.3)
CHLORIDE SERPL-SCNC: 103 MMOL/L (ref 98–110)
CHOLEST SERPL-MCNC: 255 MG/DL
CHOLEST/HDLC SERPL: 4.8 (CALC)
CO2 SERPL-SCNC: 28 MMOL/L (ref 20–32)
CREAT SERPL-MCNC: 1 MG/DL (ref 0.6–1.29)
EGFRCR SERPLBLD CKD-EPI 2021: 98 ML/MIN/1.73M2
EOSINOPHIL # BLD AUTO: 242 CELLS/UL (ref 15–500)
EOSINOPHIL NFR BLD AUTO: 2.6 %
ERYTHROCYTE [DISTWIDTH] IN BLOOD BY AUTOMATED COUNT: 12.3 % (ref 11–15)
ERYTHROCYTE [SEDIMENTATION RATE] IN BLOOD BY WESTERGREN METHOD: 9 MM/H
GLUCOSE SERPL-MCNC: 87 MG/DL (ref 65–99)
HCT VFR BLD AUTO: 48.1 % (ref 38.5–50)
HDLC SERPL-MCNC: 53 MG/DL
HGB BLD-MCNC: 16.2 G/DL (ref 13.2–17.1)
HLA-B27 QL NAA+PROBE: NORMAL
LDLC SERPL CALC-MCNC: 184 MG/DL (CALC)
LYMPHOCYTES # BLD AUTO: 1962 CELLS/UL (ref 850–3900)
LYMPHOCYTES NFR BLD AUTO: 21.1 %
MCH RBC QN AUTO: 31.7 PG (ref 27–33)
MCHC RBC AUTO-ENTMCNC: 33.7 G/DL (ref 32–36)
MCV RBC AUTO: 94.1 FL (ref 80–100)
MONOCYTES # BLD AUTO: 828 CELLS/UL (ref 200–950)
MONOCYTES NFR BLD AUTO: 8.9 %
NEUTROPHILS # BLD AUTO: 6240 CELLS/UL (ref 1500–7800)
NEUTROPHILS NFR BLD AUTO: 67.1 %
NONHDLC SERPL-MCNC: 202 MG/DL (CALC)
PLATELET # BLD AUTO: 231 THOUSAND/UL (ref 140–400)
PMV BLD REES-ECKER: 10.4 FL (ref 7.5–12.5)
POTASSIUM SERPL-SCNC: 4 MMOL/L (ref 3.5–5.3)
PROT SERPL-MCNC: 7.1 G/DL (ref 6.1–8.1)
QUEST HLAB27 TYPING RESULTS REVIEWED BY:: NORMAL
RBC # BLD AUTO: 5.11 MILLION/UL (ref 4.2–5.8)
RHEUMATOID FACT SERPL-ACNC: <10 IU/ML
SODIUM SERPL-SCNC: 139 MMOL/L (ref 135–146)
TRIGL SERPL-MCNC: 74 MG/DL
WBC # BLD AUTO: 9.3 THOUSAND/UL (ref 3.8–10.8)

## 2025-06-09 LAB
ALBUMIN SERPL-MCNC: 4.5 G/DL (ref 3.6–5.1)
ALP SERPL-CCNC: 67 U/L (ref 36–130)
ALT SERPL-CCNC: 38 U/L (ref 9–46)
ANION GAP SERPL CALCULATED.4IONS-SCNC: 8 MMOL/L (CALC) (ref 7–17)
AST SERPL-CCNC: 16 U/L (ref 10–40)
BASOPHILS # BLD AUTO: 28 CELLS/UL (ref 0–200)
BASOPHILS NFR BLD AUTO: 0.3 %
BILIRUB SERPL-MCNC: 1.2 MG/DL (ref 0.2–1.2)
BUN SERPL-MCNC: 23 MG/DL (ref 7–25)
CALCIUM SERPL-MCNC: 9.1 MG/DL (ref 8.6–10.3)
CHLORIDE SERPL-SCNC: 103 MMOL/L (ref 98–110)
CHOLEST SERPL-MCNC: 255 MG/DL
CHOLEST/HDLC SERPL: 4.8 (CALC)
CO2 SERPL-SCNC: 28 MMOL/L (ref 20–32)
CREAT SERPL-MCNC: 1 MG/DL (ref 0.6–1.29)
EGFRCR SERPLBLD CKD-EPI 2021: 98 ML/MIN/1.73M2
EOSINOPHIL # BLD AUTO: 242 CELLS/UL (ref 15–500)
EOSINOPHIL NFR BLD AUTO: 2.6 %
ERYTHROCYTE [DISTWIDTH] IN BLOOD BY AUTOMATED COUNT: 12.3 % (ref 11–15)
ERYTHROCYTE [SEDIMENTATION RATE] IN BLOOD BY WESTERGREN METHOD: 9 MM/H
GLUCOSE SERPL-MCNC: 87 MG/DL (ref 65–99)
HCT VFR BLD AUTO: 48.1 % (ref 38.5–50)
HDLC SERPL-MCNC: 53 MG/DL
HGB BLD-MCNC: 16.2 G/DL (ref 13.2–17.1)
HLA-B27 QL NAA+PROBE: NEGATIVE
LDLC SERPL CALC-MCNC: 184 MG/DL (CALC)
LYMPHOCYTES # BLD AUTO: 1962 CELLS/UL (ref 850–3900)
LYMPHOCYTES NFR BLD AUTO: 21.1 %
MCH RBC QN AUTO: 31.7 PG (ref 27–33)
MCHC RBC AUTO-ENTMCNC: 33.7 G/DL (ref 32–36)
MCV RBC AUTO: 94.1 FL (ref 80–100)
MONOCYTES # BLD AUTO: 828 CELLS/UL (ref 200–950)
MONOCYTES NFR BLD AUTO: 8.9 %
NEUTROPHILS # BLD AUTO: 6240 CELLS/UL (ref 1500–7800)
NEUTROPHILS NFR BLD AUTO: 67.1 %
NONHDLC SERPL-MCNC: 202 MG/DL (CALC)
PLATELET # BLD AUTO: 231 THOUSAND/UL (ref 140–400)
PMV BLD REES-ECKER: 10.4 FL (ref 7.5–12.5)
POTASSIUM SERPL-SCNC: 4 MMOL/L (ref 3.5–5.3)
PROT SERPL-MCNC: 7.1 G/DL (ref 6.1–8.1)
QUEST HLAB27 TYPING RESULTS REVIEWED BY:: NORMAL
RBC # BLD AUTO: 5.11 MILLION/UL (ref 4.2–5.8)
RHEUMATOID FACT SERPL-ACNC: <10 IU/ML
SODIUM SERPL-SCNC: 139 MMOL/L (ref 135–146)
TRIGL SERPL-MCNC: 74 MG/DL
WBC # BLD AUTO: 9.3 THOUSAND/UL (ref 3.8–10.8)

## 2025-06-12 ENCOUNTER — TELEPHONE (OUTPATIENT)
Dept: PRIMARY CARE | Facility: CLINIC | Age: 40
End: 2025-06-12
Payer: COMMERCIAL

## 2025-06-12 NOTE — TELEPHONE ENCOUNTER
PATIENT RETURNED CALL - HE IS AWARE OF LAB TEST RESULTS - HE DOES NOT WANT TO GO ON MEDICATIONS OVER THE PHONE - HE HAS A FOLLOW UP APPOINTMENT SCHEDULED WITH YOU ON 6/24/25 - WISHES TO DISCUSS FAR AT THE APPT.

## 2025-06-12 NOTE — TELEPHONE ENCOUNTER
----- Message from Autumn Odom sent at 6/11/2025  5:16 PM EDT -----  Please let Karthik Plunkett know recent results are within normal range except for cholesterol tests. Cut back on intake of dairy, meat, and fried/greasy food.  Also recommend adding a low-dose   medication to help bring this under control.  Let me know if he is agreeable to try this.    Thanks,  Autumn  ----- Message -----  From: Kareem FreeLunched Results In  Sent: 6/4/2025  11:19 PM EDT  To: Autumn Odom, APRN-CNP, DNP

## 2025-06-14 ENCOUNTER — HOSPITAL ENCOUNTER (OUTPATIENT)
Dept: RADIOLOGY | Facility: HOSPITAL | Age: 40
Discharge: HOME | End: 2025-06-14
Payer: COMMERCIAL

## 2025-06-14 DIAGNOSIS — M51.369 L4-L5 DISC BULGE: ICD-10-CM

## 2025-06-14 PROCEDURE — 72148 MRI LUMBAR SPINE W/O DYE: CPT

## 2025-06-14 PROCEDURE — 72148 MRI LUMBAR SPINE W/O DYE: CPT | Performed by: RADIOLOGY

## 2025-06-24 ENCOUNTER — APPOINTMENT (OUTPATIENT)
Dept: PRIMARY CARE | Facility: CLINIC | Age: 40
End: 2025-06-24
Payer: COMMERCIAL

## 2025-06-24 VITALS
WEIGHT: 193.6 LBS | TEMPERATURE: 98.1 F | DIASTOLIC BLOOD PRESSURE: 84 MMHG | SYSTOLIC BLOOD PRESSURE: 126 MMHG | OXYGEN SATURATION: 97 % | BODY MASS INDEX: 27.1 KG/M2 | HEIGHT: 71 IN | HEART RATE: 98 BPM

## 2025-06-24 DIAGNOSIS — E78.2 MIXED HYPERLIPIDEMIA: ICD-10-CM

## 2025-06-24 DIAGNOSIS — H66.001 NON-RECURRENT ACUTE SUPPURATIVE OTITIS MEDIA OF RIGHT EAR WITHOUT SPONTANEOUS RUPTURE OF TYMPANIC MEMBRANE: ICD-10-CM

## 2025-06-24 DIAGNOSIS — M51.360 DEGENERATION OF INTERVERTEBRAL DISC OF LUMBAR REGION WITH DISCOGENIC BACK PAIN: ICD-10-CM

## 2025-06-24 DIAGNOSIS — J32.9 CHRONIC SINUSITIS, UNSPECIFIED LOCATION: ICD-10-CM

## 2025-06-24 DIAGNOSIS — J30.2 SEASONAL ALLERGIES: ICD-10-CM

## 2025-06-24 PROCEDURE — 99213 OFFICE O/P EST LOW 20 MIN: CPT | Performed by: NURSE PRACTITIONER

## 2025-06-24 PROCEDURE — 1036F TOBACCO NON-USER: CPT | Performed by: NURSE PRACTITIONER

## 2025-06-24 PROCEDURE — 3008F BODY MASS INDEX DOCD: CPT | Performed by: NURSE PRACTITIONER

## 2025-06-24 RX ORDER — AZITHROMYCIN 250 MG/1
250 TABLET, FILM COATED ORAL DAILY
Qty: 6 TABLET | Refills: 0 | Status: SHIPPED | OUTPATIENT
Start: 2025-06-24 | End: 2025-06-29

## 2025-06-24 ASSESSMENT — ENCOUNTER SYMPTOMS
DYSURIA: 0
HEADACHES: 1
WEIGHT LOSS: 0
BOWEL INCONTINENCE: 0
WEAKNESS: 1
PERIANAL NUMBNESS: 0
BACK PAIN: 1
FEVER: 0
ABDOMINAL PAIN: 1
NUMBNESS: 0
TINGLING: 1
PARESTHESIAS: 0
LEG PAIN: 1
PARESIS: 0

## 2025-06-24 ASSESSMENT — PATIENT HEALTH QUESTIONNAIRE - PHQ9
SUM OF ALL RESPONSES TO PHQ9 QUESTIONS 1 AND 2: 0
2. FEELING DOWN, DEPRESSED OR HOPELESS: NOT AT ALL
1. LITTLE INTEREST OR PLEASURE IN DOING THINGS: NOT AT ALL

## 2025-06-24 NOTE — PROGRESS NOTES
"Subjective   Patient ID: Karthik Plunkett \"Marshall" is a 39 y.o. male who presents for Follow-up and Rash.     Back Pain  This is a chronic problem. The current episode started more than 1 year ago. The problem occurs constantly. The problem has been gradually worsening since onset. The pain is present in the lumbar spine and sacro-iliac. The quality of the pain is described as aching, cramping and shooting. The pain radiates to the left knee and left thigh. The pain is at a severity of 4/10. The pain is The same all the time. The symptoms are aggravated by bending, position, lying down, standing and stress. Stiffness is present All day. Associated symptoms include abdominal pain, headaches, leg pain, tingling and weakness. Pertinent negatives include no bladder incontinence, bowel incontinence, chest pain, dysuria, fever, numbness, paresis, paresthesias, pelvic pain, perianal numbness or weight loss. Risk factors include lack of exercise, obesity, poor posture, recent trauma and sedentary lifestyle.     Patient presents in office today for follow up on blood work and back pain. Patient also has an itchy rash ongoing since June 4th. Appeared day after he was at the lab. He has been using topical steroid but that made it worse. Aquaphor didn't help or make worse. Being in sun makes the rash flair.    States back pain is almost back to chronic baseline. Gets pain even with home PT/yoga stretches. All attempts at PT in the past have been very painful the next day. Has tried acupuncture. Has been to [facility].    Right ear itching and pressure. Hx infection about one year ago, none previously.    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 Has always had it checked at work via fingerprick testing and had much better numbers.    Review of Systems   Constitutional:  Negative for appetite change, chills, diaphoresis, fatigue, fever, unexpected weight change and weight loss.   Respiratory:  Negative " "for cough, chest tightness, shortness of breath and wheezing.    Cardiovascular:  Negative for chest pain, palpitations and leg swelling.   Gastrointestinal:  Positive for abdominal pain. Negative for bowel incontinence, constipation and diarrhea.   Genitourinary:  Negative for bladder incontinence, dysuria and pelvic pain.   Musculoskeletal:  Positive for back pain. Negative for arthralgias and myalgias.   Skin:  Negative for rash and wound.   Neurological:  Positive for tingling, weakness and headaches. Negative for dizziness, syncope, facial asymmetry, numbness and paresthesias.   Psychiatric/Behavioral:  Negative for confusion. The patient is not nervous/anxious.       Objective   Vital Signs: /84 (BP Location: Right arm, Patient Position: Sitting)   Pulse 98   Temp 36.7 °C (98.1 °F) (Temporal)   Ht 1.791 m (5' 10.5\")   Wt 87.8 kg (193 lb 9.6 oz)   SpO2 97%   BMI 27.39 kg/m²     Physical Exam  Vitals and nursing note reviewed.   Constitutional:       General: He is not in acute distress.     Appearance: Normal appearance. He is not ill-appearing.   HENT:      Head: Normocephalic and atraumatic.      Right Ear: External ear normal.      Left Ear: External ear normal.      Nose: No congestion or rhinorrhea.      Mouth/Throat:      Mouth: Mucous membranes are moist.      Pharynx: No oropharyngeal exudate or posterior oropharyngeal erythema.   Eyes:      General:         Right eye: No discharge.         Left eye: No discharge.      Extraocular Movements: Extraocular movements intact.      Conjunctiva/sclera: Conjunctivae normal.      Pupils: Pupils are equal, round, and reactive to light.   Cardiovascular:      Rate and Rhythm: Normal rate and regular rhythm.      Heart sounds: No murmur heard.  Pulmonary:      Effort: Pulmonary effort is normal. No respiratory distress.      Breath sounds: Normal breath sounds and air entry.   Abdominal:      General: Bowel sounds are normal.      Palpations: Abdomen is " soft.   Musculoskeletal:      Cervical back: Normal.      Thoracic back: Normal.      Lumbar back: No spasms or tenderness. Decreased range of motion. Negative right straight leg raise test and negative left straight leg raise test.      Right lower leg: No edema.      Left lower leg: No edema.   Skin:     General: Skin is warm and dry.      Coloration: Skin is not jaundiced.      Findings: No erythema or rash.   Neurological:      General: No focal deficit present.      Mental Status: He is alert. Mental status is at baseline.      Sensory: Sensation is intact.      Deep Tendon Reflexes:      Reflex Scores:       Patellar reflexes are 1+ on the right side and 2+ on the left side.       Achilles reflexes are 1+ on the right side and 2+ on the left side.  Psychiatric:         Mood and Affect: Mood normal.         Behavior: Behavior normal.         Thought Content: Thought content normal.     POCT today: No results found for this visit on 06/24/25.     Assessment & Plan  Diagnoses and all orders for this visit:  Non-recurrent acute suppurative otitis media of right ear without spontaneous rupture of tympanic membrane  Chronic sinusitis, unspecified location  Seasonal allergies  -     azithromycin (Zithromax) 250 mg tablet; Take 1 tablet (250 mg) by mouth once daily for 5 days. Take 2 tablets (500 mg) today, then 1 tablet (250 mg) daily days 2-5.  -     Continue supportive care with OTC medications as needed, rest, fluids. Let us know if symptoms persist or fail to completely resolve to baseline with current treatment. Verbalized understanding.      Degeneration of intervertebral disc of lumbar region with discogenic back pain  Reports back pain is mostly resolved to chronic baseline. Discussed getting an opinion from Pain Medicine to see if spinal injections or other options may be possible to help improve quality of life. Has previously had surgical eval, and was reportedly advised that surgery will likely involve  "fixation and will ultimately \"shift\" the pain further up. He has been to PT multiple times, but it always worsens his pain.  -     Referral to Pain Medicine; Future    Mixed hyperlipidemia  -     Lipid Panel; Future    Reviewed/discussed treatment options and health maintenance. Take medications as prescribed. We will contact you with the results of any ordered testing; please send a [employer] message or call the office if you do not hear from us. Follow up in the office with me in 6 months and as needed. Return sooner if symptoms do not resolve as expected.     Autumn Odom, APRN-CNP, DNP  Family Nurse Practitioner  [facility]  "

## 2025-06-25 ENCOUNTER — APPOINTMENT (OUTPATIENT)
Dept: ALLERGY | Facility: CLINIC | Age: 40
End: 2025-06-25
Payer: COMMERCIAL

## 2025-06-25 DIAGNOSIS — J30.89 ALLERGIC RHINITIS DUE TO DUST MITE: ICD-10-CM

## 2025-06-25 DIAGNOSIS — J30.1 SEASONAL ALLERGIC RHINITIS DUE TO POLLEN: ICD-10-CM

## 2025-06-25 PROCEDURE — 95117 IMMUNOTHERAPY INJECTIONS: CPT | Performed by: ALLERGY & IMMUNOLOGY

## 2025-07-01 ASSESSMENT — ENCOUNTER SYMPTOMS
FATIGUE: 0
PALPITATIONS: 0
HEADACHES: 1
WOUND: 0
PARESIS: 0
DIAPHORESIS: 0
WEAKNESS: 1
CHILLS: 0
NUMBNESS: 0
SHORTNESS OF BREATH: 0
WEIGHT LOSS: 0
DIARRHEA: 0
ABDOMINAL PAIN: 1
MYALGIAS: 0
ARTHRALGIAS: 0
BACK PAIN: 1
BOWEL INCONTINENCE: 0
APPETITE CHANGE: 0
PARESTHESIAS: 0
DYSURIA: 0
FEVER: 0
CHEST TIGHTNESS: 0
CONSTIPATION: 0
PERIANAL NUMBNESS: 0
UNEXPECTED WEIGHT CHANGE: 0
FACIAL ASYMMETRY: 0
CONFUSION: 0
COUGH: 0
LEG PAIN: 1
NERVOUS/ANXIOUS: 0
WHEEZING: 0
DIZZINESS: 0
TINGLING: 1

## 2025-07-23 ENCOUNTER — APPOINTMENT (OUTPATIENT)
Dept: ALLERGY | Facility: CLINIC | Age: 40
End: 2025-07-23
Payer: COMMERCIAL

## 2025-07-23 DIAGNOSIS — J30.89 ALLERGIC RHINITIS DUE TO DUST MITE: ICD-10-CM

## 2025-07-23 DIAGNOSIS — J30.1 SEASONAL ALLERGIC RHINITIS DUE TO POLLEN: ICD-10-CM

## 2025-07-23 PROCEDURE — 95117 IMMUNOTHERAPY INJECTIONS: CPT | Performed by: ALLERGY & IMMUNOLOGY

## 2025-08-15 ENCOUNTER — OFFICE VISIT (OUTPATIENT)
Dept: PAIN MEDICINE | Facility: CLINIC | Age: 40
End: 2025-08-15
Payer: COMMERCIAL

## 2025-08-15 DIAGNOSIS — M47.816 LUMBAR SPONDYLOSIS: Primary | ICD-10-CM

## 2025-08-15 PROCEDURE — 99204 OFFICE O/P NEW MOD 45 MIN: CPT | Performed by: PAIN MEDICINE

## 2025-08-15 PROCEDURE — 1036F TOBACCO NON-USER: CPT | Performed by: PAIN MEDICINE

## 2025-08-15 PROCEDURE — 99212 OFFICE O/P EST SF 10 MIN: CPT

## 2025-08-15 ASSESSMENT — PAIN DESCRIPTION - DESCRIPTORS: DESCRIPTORS: DISCOMFORT

## 2025-08-15 ASSESSMENT — PAIN - FUNCTIONAL ASSESSMENT: PAIN_FUNCTIONAL_ASSESSMENT: 0-10

## 2025-08-15 ASSESSMENT — PAIN SCALES - GENERAL: PAINLEVEL_OUTOF10: 4

## 2025-08-15 ASSESSMENT — COLUMBIA-SUICIDE SEVERITY RATING SCALE - C-SSRS
2. HAVE YOU ACTUALLY HAD ANY THOUGHTS OF KILLING YOURSELF?: NO
6. HAVE YOU EVER DONE ANYTHING, STARTED TO DO ANYTHING, OR PREPARED TO DO ANYTHING TO END YOUR LIFE?: NO
1. IN THE PAST MONTH, HAVE YOU WISHED YOU WERE DEAD OR WISHED YOU COULD GO TO SLEEP AND NOT WAKE UP?: NO

## 2025-08-20 ENCOUNTER — APPOINTMENT (OUTPATIENT)
Dept: ALLERGY | Facility: CLINIC | Age: 40
End: 2025-08-20
Payer: COMMERCIAL

## 2025-08-20 DIAGNOSIS — J30.89 ALLERGIC RHINITIS DUE TO DUST MITE: ICD-10-CM

## 2025-08-20 DIAGNOSIS — J30.1 SEASONAL ALLERGIC RHINITIS DUE TO POLLEN: ICD-10-CM

## 2025-08-20 PROCEDURE — 95117 IMMUNOTHERAPY INJECTIONS: CPT | Performed by: ALLERGY & IMMUNOLOGY

## 2025-09-17 ENCOUNTER — APPOINTMENT (OUTPATIENT)
Dept: ALLERGY | Facility: CLINIC | Age: 40
End: 2025-09-17
Payer: COMMERCIAL

## 2025-10-15 ENCOUNTER — APPOINTMENT (OUTPATIENT)
Dept: ALLERGY | Facility: CLINIC | Age: 40
End: 2025-10-15
Payer: COMMERCIAL

## 2025-11-12 ENCOUNTER — APPOINTMENT (OUTPATIENT)
Dept: ALLERGY | Facility: CLINIC | Age: 40
End: 2025-11-12
Payer: COMMERCIAL